# Patient Record
Sex: MALE
[De-identification: names, ages, dates, MRNs, and addresses within clinical notes are randomized per-mention and may not be internally consistent; named-entity substitution may affect disease eponyms.]

---

## 2017-01-05 PROCEDURE — 88300 SURGICAL PATH GROSS: CPT | Performed by: OTOLARYNGOLOGY

## 2017-04-25 PROBLEM — S52.125A CLOSED NONDISPLACED FRACTURE OF HEAD OF LEFT RADIUS, INITIAL ENCOUNTER: Status: ACTIVE | Noted: 2017-04-25

## 2018-09-10 ENCOUNTER — CHARTING TRANS (OUTPATIENT)
Dept: OTHER | Age: 28
End: 2018-09-10

## 2018-11-01 PROBLEM — S52.125A CLOSED NONDISPLACED FRACTURE OF HEAD OF LEFT RADIUS, INITIAL ENCOUNTER: Status: RESOLVED | Noted: 2017-04-25 | Resolved: 2018-11-01

## 2018-12-08 VITALS
WEIGHT: 206.68 LBS | OXYGEN SATURATION: 98 % | HEART RATE: 95 BPM | SYSTOLIC BLOOD PRESSURE: 112 MMHG | HEIGHT: 72 IN | RESPIRATION RATE: 16 BRPM | BODY MASS INDEX: 27.99 KG/M2 | TEMPERATURE: 98.4 F | DIASTOLIC BLOOD PRESSURE: 74 MMHG

## 2019-07-20 ENCOUNTER — APPOINTMENT (OUTPATIENT)
Dept: GENERAL RADIOLOGY | Facility: HOSPITAL | Age: 29
End: 2019-07-20
Attending: EMERGENCY MEDICINE
Payer: COMMERCIAL

## 2019-07-20 ENCOUNTER — HOSPITAL ENCOUNTER (EMERGENCY)
Facility: HOSPITAL | Age: 29
Discharge: HOME | End: 2019-07-20
Attending: EMERGENCY MEDICINE
Payer: COMMERCIAL

## 2019-07-20 VITALS
DIASTOLIC BLOOD PRESSURE: 81 MMHG | OXYGEN SATURATION: 97 % | TEMPERATURE: 98 F | BODY MASS INDEX: 27.09 KG/M2 | SYSTOLIC BLOOD PRESSURE: 127 MMHG | HEART RATE: 71 BPM | RESPIRATION RATE: 14 BRPM | WEIGHT: 200 LBS | HEIGHT: 72 IN
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
## 2019-07-20 DIAGNOSIS — S70.01XA CONTUSION OF RIGHT HIP, INITIAL ENCOUNTER: ICD-10-CM

## 2019-07-20 DIAGNOSIS — T07.XXXA ABRASIONS OF MULTIPLE SITES: ICD-10-CM

## 2019-07-20 DIAGNOSIS — S40.012A CONTUSION OF LEFT SHOULDER, INITIAL ENCOUNTER: ICD-10-CM

## 2019-07-20 PROCEDURE — 72040 X-RAY EXAM NECK SPINE 2-3 VW: CPT | Performed by: EMERGENCY MEDICINE

## 2019-07-20 PROCEDURE — 99284 EMERGENCY DEPT VISIT MOD MDM: CPT

## 2019-07-20 PROCEDURE — 73502 X-RAY EXAM HIP UNI 2-3 VIEWS: CPT | Performed by: EMERGENCY MEDICINE

## 2019-07-20 PROCEDURE — 73030 X-RAY EXAM OF SHOULDER: CPT | Performed by: EMERGENCY MEDICINE

## 2019-07-20 RX ORDER — IBUPROFEN 600 MG/1
600 TABLET ORAL ONCE
Status: COMPLETED | OUTPATIENT
Start: 2019-07-20 | End: 2019-07-20

## 2019-07-20 RX ORDER — CYCLOBENZAPRINE HCL 10 MG
10 TABLET ORAL 3 TIMES DAILY PRN
Qty: 12 TABLET | Refills: 0 | Status: SHIPPED | OUTPATIENT
Start: 2019-07-20 | End: 2019-07-27

## 2019-07-20 RX ORDER — CYCLOBENZAPRINE HCL 10 MG
10 TABLET ORAL ONCE
Status: COMPLETED | OUTPATIENT
Start: 2019-07-20 | End: 2019-07-20

## 2019-07-20 NOTE — ED NOTES
Assumed care of patient. Patient awake and alert x 4. C/O pain 5/10. Medicated- see MAR. Awaiting imaging results at this time. Vitals stable. Will continue to monitor.
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
## 2019-10-03 ENCOUNTER — OFFICE VISIT (OUTPATIENT)
Dept: FAMILY MEDICINE CLINIC | Facility: CLINIC | Age: 29
End: 2019-10-03
Payer: COMMERCIAL

## 2019-10-03 VITALS
RESPIRATION RATE: 16 BRPM | HEIGHT: 70.25 IN | HEART RATE: 76 BPM | DIASTOLIC BLOOD PRESSURE: 70 MMHG | SYSTOLIC BLOOD PRESSURE: 128 MMHG | BODY MASS INDEX: 26.03 KG/M2 | WEIGHT: 181.81 LBS | TEMPERATURE: 98 F

## 2019-10-03 DIAGNOSIS — G47.33 OSA ON CPAP: ICD-10-CM

## 2019-10-03 DIAGNOSIS — R14.0 BLOATING: ICD-10-CM

## 2019-10-03 DIAGNOSIS — R19.7 DIARRHEA, UNSPECIFIED TYPE: ICD-10-CM

## 2019-10-03 DIAGNOSIS — Z99.89 OSA ON CPAP: ICD-10-CM

## 2019-10-03 DIAGNOSIS — Z13.220 LIPID SCREENING: ICD-10-CM

## 2019-10-03 DIAGNOSIS — Z00.00 ANNUAL PHYSICAL EXAM: Primary | ICD-10-CM

## 2019-10-03 DIAGNOSIS — R63.4 WEIGHT LOSS: ICD-10-CM

## 2019-10-03 DIAGNOSIS — Z00.00 LABORATORY EXAM ORDERED AS PART OF ROUTINE GENERAL MEDICAL EXAMINATION: ICD-10-CM

## 2019-10-03 PROCEDURE — 99385 PREV VISIT NEW AGE 18-39: CPT | Performed by: FAMILY MEDICINE

## 2019-10-03 NOTE — PROGRESS NOTES
Patient presents with:  Establish Care: New Pt  Well Adult: Annual physical     HPI:   Kip Pelaez is a 30year old male who presents for a complete physical exam. He is a new patient. Last colonoscopy:  N/a - at 48. Last PSA:  N/a - at 50.    Im Laterality Date   • ENDOSCOPIC SUBMUCOUS RESECTION INFERIOR TURBINATES Bilateral 1/5/2017    Performed by Emy Parada MD at [facility]   • OTHER SURGICAL HISTORY      s/p wisdom tooth extraction   • SEPTOPLASTY NASAL N/A 1/5/2017    Performe reflexes. Normal coordination and gait     ASSESSMENT AND PLAN:     Ranelle Lanes was seen in the office today:  had concerns including Establish Care (New Pt) and Well Adult (Annual physical).     1. Annual physical exam  Overall fair  Need to stop vapi

## 2019-10-10 ENCOUNTER — LAB ENCOUNTER (OUTPATIENT)
Dept: LAB | Age: 29
End: 2019-10-10
Attending: FAMILY MEDICINE
Payer: COMMERCIAL

## 2019-10-10 DIAGNOSIS — Z13.220 LIPID SCREENING: ICD-10-CM

## 2019-10-10 DIAGNOSIS — Z00.00 LABORATORY EXAM ORDERED AS PART OF ROUTINE GENERAL MEDICAL EXAMINATION: ICD-10-CM

## 2019-10-10 PROCEDURE — 80050 GENERAL HEALTH PANEL: CPT | Performed by: FAMILY MEDICINE

## 2019-10-10 PROCEDURE — 36415 COLL VENOUS BLD VENIPUNCTURE: CPT | Performed by: FAMILY MEDICINE

## 2019-10-10 PROCEDURE — 80061 LIPID PANEL: CPT | Performed by: FAMILY MEDICINE

## 2019-10-17 ENCOUNTER — TELEPHONE (OUTPATIENT)
Dept: FAMILY MEDICINE CLINIC | Facility: CLINIC | Age: 29
End: 2019-10-17

## 2020-10-24 ENCOUNTER — HOSPITAL ENCOUNTER (EMERGENCY)
Facility: HOSPITAL | Age: 30
Discharge: HOME | End: 2020-10-24
Attending: PEDIATRICS
Payer: COMMERCIAL

## 2020-10-24 VITALS
SYSTOLIC BLOOD PRESSURE: 155 MMHG | HEART RATE: 94 BPM | BODY MASS INDEX: 26 KG/M2 | TEMPERATURE: 100 F | WEIGHT: 180 LBS | DIASTOLIC BLOOD PRESSURE: 93 MMHG | OXYGEN SATURATION: 98 % | RESPIRATION RATE: 20 BRPM

## 2020-10-24 DIAGNOSIS — Z20.822 CLOSE EXPOSURE TO COVID-19 VIRUS: Primary | ICD-10-CM

## 2020-10-24 PROCEDURE — 99283 EMERGENCY DEPT VISIT LOW MDM: CPT

## 2020-10-24 NOTE — ED PROVIDER NOTES
Patient Seen in: [facility] Emergency Department      History   Patient presents with:  Testing    Stated Complaint: Request for COVID test. Mother in law tested positive. Asymptomatic     HPI    27-year-old male to ER for COVID-19 testing.   He stat Awake, alert, NAD  HEENT: PERRLA; TMS clear; OP clear  COR:  RRR  Chest: clear  Abdomen: soft, NT, no HSM  : normal  Neuro:  CN 2-12 grossly intact, gait normal; strength 5/5 UEs and LEs  Extremities:  CR < 2 sec               ED Course     Labs Reviewed

## 2020-12-03 ENCOUNTER — ANESTHESIA (OUTPATIENT)
Dept: SURGERY | Facility: HOSPITAL | Age: 30
DRG: 330 | End: 2020-12-03
Payer: COMMERCIAL

## 2020-12-03 ENCOUNTER — APPOINTMENT (OUTPATIENT)
Dept: CT IMAGING | Facility: HOSPITAL | Age: 30
DRG: 330 | End: 2020-12-03
Attending: EMERGENCY MEDICINE
Payer: COMMERCIAL

## 2020-12-03 ENCOUNTER — HOSPITAL ENCOUNTER (INPATIENT)
Facility: HOSPITAL | Age: 30
LOS: 6 days | Discharge: HOME | DRG: 330 | End: 2020-12-09
Attending: EMERGENCY MEDICINE | Admitting: INTERNAL MEDICINE
Payer: COMMERCIAL

## 2020-12-03 ENCOUNTER — APPOINTMENT (OUTPATIENT)
Dept: GENERAL RADIOLOGY | Facility: HOSPITAL | Age: 30
DRG: 330 | End: 2020-12-03
Attending: EMERGENCY MEDICINE
Payer: COMMERCIAL

## 2020-12-03 ENCOUNTER — ANESTHESIA EVENT (OUTPATIENT)
Dept: SURGERY | Facility: HOSPITAL | Age: 30
DRG: 330 | End: 2020-12-03
Payer: COMMERCIAL

## 2020-12-03 DIAGNOSIS — K56.609 SMALL BOWEL OBSTRUCTION (HCC): Primary | ICD-10-CM

## 2020-12-03 PROBLEM — R79.89 AZOTEMIA: Status: ACTIVE | Noted: 2020-12-03

## 2020-12-03 PROCEDURE — 0DBB0ZZ EXCISION OF ILEUM, OPEN APPROACH: ICD-10-PCS | Performed by: SURGERY

## 2020-12-03 PROCEDURE — 3074F SYST BP LT 130 MM HG: CPT | Performed by: STUDENT IN AN ORGANIZED HEALTH CARE EDUCATION/TRAINING PROGRAM

## 2020-12-03 PROCEDURE — 3E0T3BZ INTRODUCTION OF ANESTHETIC AGENT INTO PERIPHERAL NERVES AND PLEXI, PERCUTANEOUS APPROACH: ICD-10-PCS | Performed by: ANESTHESIOLOGY

## 2020-12-03 PROCEDURE — 71045 X-RAY EXAM CHEST 1 VIEW: CPT | Performed by: EMERGENCY MEDICINE

## 2020-12-03 PROCEDURE — 3078F DIAST BP <80 MM HG: CPT | Performed by: STUDENT IN AN ORGANIZED HEALTH CARE EDUCATION/TRAINING PROGRAM

## 2020-12-03 PROCEDURE — 76942 ECHO GUIDE FOR BIOPSY: CPT | Performed by: ANESTHESIOLOGY

## 2020-12-03 PROCEDURE — 99254 IP/OBS CNSLTJ NEW/EST MOD 60: CPT | Performed by: SURGERY

## 2020-12-03 PROCEDURE — 3008F BODY MASS INDEX DOCD: CPT | Performed by: STUDENT IN AN ORGANIZED HEALTH CARE EDUCATION/TRAINING PROGRAM

## 2020-12-03 PROCEDURE — 0DN80ZZ RELEASE SMALL INTESTINE, OPEN APPROACH: ICD-10-PCS | Performed by: SURGERY

## 2020-12-03 PROCEDURE — 74177 CT ABD & PELVIS W/CONTRAST: CPT | Performed by: EMERGENCY MEDICINE

## 2020-12-03 PROCEDURE — 99222 1ST HOSP IP/OBS MODERATE 55: CPT | Performed by: STUDENT IN AN ORGANIZED HEALTH CARE EDUCATION/TRAINING PROGRAM

## 2020-12-03 RX ORDER — ONDANSETRON 2 MG/ML
4 INJECTION INTRAMUSCULAR; INTRAVENOUS ONCE
Status: COMPLETED | OUTPATIENT
Start: 2020-12-03 | End: 2020-12-03

## 2020-12-03 RX ORDER — ACETAMINOPHEN 325 MG/1
650 TABLET ORAL EVERY 4 HOURS PRN
Status: DISCONTINUED | OUTPATIENT
Start: 2020-12-03 | End: 2020-12-09

## 2020-12-03 RX ORDER — ENOXAPARIN SODIUM 100 MG/ML
40 INJECTION SUBCUTANEOUS DAILY
Status: DISCONTINUED | OUTPATIENT
Start: 2020-12-04 | End: 2020-12-03 | Stop reason: HOSPADM

## 2020-12-03 RX ORDER — HYDROCODONE BITARTRATE AND ACETAMINOPHEN 5; 325 MG/1; MG/1
1 TABLET ORAL EVERY 4 HOURS PRN
Status: DISCONTINUED | OUTPATIENT
Start: 2020-12-03 | End: 2020-12-09

## 2020-12-03 RX ORDER — HYDROCODONE BITARTRATE AND ACETAMINOPHEN 5; 325 MG/1; MG/1
2 TABLET ORAL EVERY 4 HOURS PRN
Status: DISCONTINUED | OUTPATIENT
Start: 2020-12-03 | End: 2020-12-09

## 2020-12-03 RX ORDER — CEFAZOLIN SODIUM/WATER 2 G/20 ML
SYRINGE (ML) INTRAVENOUS AS NEEDED
Status: DISCONTINUED | OUTPATIENT
Start: 2020-12-03 | End: 2020-12-03 | Stop reason: SURG

## 2020-12-03 RX ORDER — HYDROMORPHONE HYDROCHLORIDE 1 MG/ML
0.2 INJECTION, SOLUTION INTRAMUSCULAR; INTRAVENOUS; SUBCUTANEOUS EVERY 2 HOUR PRN
Status: DISCONTINUED | OUTPATIENT
Start: 2020-12-03 | End: 2020-12-09

## 2020-12-03 RX ORDER — FAMOTIDINE 10 MG/ML
20 INJECTION, SOLUTION INTRAVENOUS 2 TIMES DAILY
Status: DISCONTINUED | OUTPATIENT
Start: 2020-12-03 | End: 2020-12-06

## 2020-12-03 RX ORDER — MORPHINE SULFATE 4 MG/ML
4 INJECTION, SOLUTION INTRAMUSCULAR; INTRAVENOUS ONCE
Status: COMPLETED | OUTPATIENT
Start: 2020-12-03 | End: 2020-12-03

## 2020-12-03 RX ORDER — SODIUM PHOSPHATE, DIBASIC AND SODIUM PHOSPHATE, MONOBASIC 7; 19 G/133ML; G/133ML
1 ENEMA RECTAL ONCE AS NEEDED
Status: DISCONTINUED | OUTPATIENT
Start: 2020-12-03 | End: 2020-12-09

## 2020-12-03 RX ORDER — ONDANSETRON 2 MG/ML
4 INJECTION INTRAMUSCULAR; INTRAVENOUS EVERY 6 HOURS PRN
Status: DISCONTINUED | OUTPATIENT
Start: 2020-12-03 | End: 2020-12-09

## 2020-12-03 RX ORDER — MORPHINE SULFATE 4 MG/ML
2 INJECTION, SOLUTION INTRAMUSCULAR; INTRAVENOUS EVERY 2 HOUR PRN
Status: DISCONTINUED | OUTPATIENT
Start: 2020-12-03 | End: 2020-12-03 | Stop reason: HOSPADM

## 2020-12-03 RX ORDER — HYDROMORPHONE HYDROCHLORIDE 1 MG/ML
0.5 INJECTION, SOLUTION INTRAMUSCULAR; INTRAVENOUS; SUBCUTANEOUS ONCE
Status: COMPLETED | OUTPATIENT
Start: 2020-12-03 | End: 2020-12-03

## 2020-12-03 RX ORDER — HEPARIN SODIUM 5000 [USP'U]/ML
5000 INJECTION, SOLUTION INTRAVENOUS; SUBCUTANEOUS ONCE
Status: COMPLETED | OUTPATIENT
Start: 2020-12-03 | End: 2020-12-03

## 2020-12-03 RX ORDER — MAGNESIUM HYDROXIDE/ALUMINUM HYDROXICE/SIMETHICONE 120; 1200; 1200 MG/30ML; MG/30ML; MG/30ML
30 SUSPENSION ORAL ONCE
Status: COMPLETED | OUTPATIENT
Start: 2020-12-03 | End: 2020-12-03

## 2020-12-03 RX ORDER — SODIUM PHOSPHATE, DIBASIC AND SODIUM PHOSPHATE, MONOBASIC 7; 19 G/133ML; G/133ML
1 ENEMA RECTAL ONCE AS NEEDED
Status: DISCONTINUED | OUTPATIENT
Start: 2020-12-03 | End: 2020-12-03 | Stop reason: HOSPADM

## 2020-12-03 RX ORDER — SODIUM CHLORIDE 9 MG/ML
INJECTION, SOLUTION INTRAVENOUS CONTINUOUS
Status: DISCONTINUED | OUTPATIENT
Start: 2020-12-03 | End: 2020-12-03 | Stop reason: HOSPADM

## 2020-12-03 RX ORDER — SODIUM CHLORIDE, SODIUM LACTATE, POTASSIUM CHLORIDE, CALCIUM CHLORIDE 600; 310; 30; 20 MG/100ML; MG/100ML; MG/100ML; MG/100ML
INJECTION, SOLUTION INTRAVENOUS CONTINUOUS PRN
Status: DISCONTINUED | OUTPATIENT
Start: 2020-12-03 | End: 2020-12-03 | Stop reason: SURG

## 2020-12-03 RX ORDER — HYDROMORPHONE HYDROCHLORIDE 1 MG/ML
INJECTION, SOLUTION INTRAMUSCULAR; INTRAVENOUS; SUBCUTANEOUS
Status: COMPLETED
Start: 2020-12-03 | End: 2020-12-03

## 2020-12-03 RX ORDER — PROCHLORPERAZINE EDISYLATE 5 MG/ML
5 INJECTION INTRAMUSCULAR; INTRAVENOUS EVERY 8 HOURS PRN
Status: DISCONTINUED | OUTPATIENT
Start: 2020-12-03 | End: 2020-12-03 | Stop reason: HOSPADM

## 2020-12-03 RX ORDER — MIDAZOLAM HYDROCHLORIDE 1 MG/ML
INJECTION INTRAMUSCULAR; INTRAVENOUS AS NEEDED
Status: DISCONTINUED | OUTPATIENT
Start: 2020-12-03 | End: 2020-12-03 | Stop reason: SURG

## 2020-12-03 RX ORDER — HEPARIN SODIUM 5000 [USP'U]/ML
5000 INJECTION, SOLUTION INTRAVENOUS; SUBCUTANEOUS EVERY 8 HOURS SCHEDULED
Status: DISCONTINUED | OUTPATIENT
Start: 2020-12-03 | End: 2020-12-09

## 2020-12-03 RX ORDER — ONDANSETRON 2 MG/ML
4 INJECTION INTRAMUSCULAR; INTRAVENOUS EVERY 6 HOURS PRN
Status: DISCONTINUED | OUTPATIENT
Start: 2020-12-03 | End: 2020-12-03 | Stop reason: HOSPADM

## 2020-12-03 RX ORDER — ACETAMINOPHEN 10 MG/ML
INJECTION, SOLUTION INTRAVENOUS AS NEEDED
Status: DISCONTINUED | OUTPATIENT
Start: 2020-12-03 | End: 2020-12-03 | Stop reason: SURG

## 2020-12-03 RX ORDER — ONDANSETRON 2 MG/ML
4 INJECTION INTRAMUSCULAR; INTRAVENOUS AS NEEDED
Status: DISCONTINUED | OUTPATIENT
Start: 2020-12-03 | End: 2020-12-03 | Stop reason: HOSPADM

## 2020-12-03 RX ORDER — HYDROMORPHONE HYDROCHLORIDE 1 MG/ML
0.5 INJECTION, SOLUTION INTRAMUSCULAR; INTRAVENOUS; SUBCUTANEOUS EVERY 5 MIN PRN
Status: DISCONTINUED | OUTPATIENT
Start: 2020-12-03 | End: 2020-12-03 | Stop reason: HOSPADM

## 2020-12-03 RX ORDER — SODIUM CHLORIDE, SODIUM LACTATE, POTASSIUM CHLORIDE, CALCIUM CHLORIDE 600; 310; 30; 20 MG/100ML; MG/100ML; MG/100ML; MG/100ML
INJECTION, SOLUTION INTRAVENOUS CONTINUOUS
Status: DISCONTINUED | OUTPATIENT
Start: 2020-12-03 | End: 2020-12-03 | Stop reason: HOSPADM

## 2020-12-03 RX ORDER — SODIUM CHLORIDE 9 MG/ML
INJECTION, SOLUTION INTRAVENOUS CONTINUOUS
Status: DISCONTINUED | OUTPATIENT
Start: 2020-12-03 | End: 2020-12-06

## 2020-12-03 RX ORDER — DOCUSATE SODIUM 100 MG/1
100 CAPSULE, LIQUID FILLED ORAL 2 TIMES DAILY
Status: DISCONTINUED | OUTPATIENT
Start: 2020-12-03 | End: 2020-12-09

## 2020-12-03 RX ORDER — HYDROMORPHONE HYDROCHLORIDE 1 MG/ML
0.4 INJECTION, SOLUTION INTRAMUSCULAR; INTRAVENOUS; SUBCUTANEOUS EVERY 2 HOUR PRN
Status: DISCONTINUED | OUTPATIENT
Start: 2020-12-03 | End: 2020-12-09

## 2020-12-03 RX ORDER — LIDOCAINE HYDROCHLORIDE 10 MG/ML
INJECTION, SOLUTION EPIDURAL; INFILTRATION; INTRACAUDAL; PERINEURAL AS NEEDED
Status: DISCONTINUED | OUTPATIENT
Start: 2020-12-03 | End: 2020-12-03 | Stop reason: SURG

## 2020-12-03 RX ORDER — POLYETHYLENE GLYCOL 3350 17 G/17G
17 POWDER, FOR SOLUTION ORAL DAILY PRN
Status: DISCONTINUED | OUTPATIENT
Start: 2020-12-03 | End: 2020-12-03 | Stop reason: HOSPADM

## 2020-12-03 RX ORDER — ACETAMINOPHEN 10 MG/ML
1000 INJECTION, SOLUTION INTRAVENOUS EVERY 6 HOURS PRN
Status: DISCONTINUED | OUTPATIENT
Start: 2020-12-03 | End: 2020-12-03 | Stop reason: HOSPADM

## 2020-12-03 RX ORDER — BISACODYL 10 MG
10 SUPPOSITORY, RECTAL RECTAL
Status: DISCONTINUED | OUTPATIENT
Start: 2020-12-03 | End: 2020-12-03 | Stop reason: HOSPADM

## 2020-12-03 RX ORDER — LIDOCAINE HYDROCHLORIDE 20 MG/ML
10 SOLUTION OROPHARYNGEAL ONCE
Status: COMPLETED | OUTPATIENT
Start: 2020-12-03 | End: 2020-12-03

## 2020-12-03 RX ORDER — HYDROMORPHONE HYDROCHLORIDE 1 MG/ML
0.8 INJECTION, SOLUTION INTRAMUSCULAR; INTRAVENOUS; SUBCUTANEOUS EVERY 2 HOUR PRN
Status: DISCONTINUED | OUTPATIENT
Start: 2020-12-03 | End: 2020-12-09

## 2020-12-03 RX ORDER — MORPHINE SULFATE 4 MG/ML
1 INJECTION, SOLUTION INTRAMUSCULAR; INTRAVENOUS EVERY 2 HOUR PRN
Status: DISCONTINUED | OUTPATIENT
Start: 2020-12-03 | End: 2020-12-03 | Stop reason: HOSPADM

## 2020-12-03 RX ORDER — FAMOTIDINE 20 MG/1
20 TABLET ORAL 2 TIMES DAILY
Status: DISCONTINUED | OUTPATIENT
Start: 2020-12-03 | End: 2020-12-09

## 2020-12-03 RX ORDER — MORPHINE SULFATE 4 MG/ML
4 INJECTION, SOLUTION INTRAMUSCULAR; INTRAVENOUS EVERY 2 HOUR PRN
Status: DISCONTINUED | OUTPATIENT
Start: 2020-12-03 | End: 2020-12-03 | Stop reason: HOSPADM

## 2020-12-03 RX ORDER — BISACODYL 10 MG
10 SUPPOSITORY, RECTAL RECTAL
Status: DISCONTINUED | OUTPATIENT
Start: 2020-12-03 | End: 2020-12-09

## 2020-12-03 RX ORDER — NALOXONE HYDROCHLORIDE 0.4 MG/ML
80 INJECTION, SOLUTION INTRAMUSCULAR; INTRAVENOUS; SUBCUTANEOUS AS NEEDED
Status: DISCONTINUED | OUTPATIENT
Start: 2020-12-03 | End: 2020-12-03 | Stop reason: HOSPADM

## 2020-12-03 RX ORDER — POLYETHYLENE GLYCOL 3350 17 G/17G
17 POWDER, FOR SOLUTION ORAL DAILY PRN
Status: DISCONTINUED | OUTPATIENT
Start: 2020-12-03 | End: 2020-12-09

## 2020-12-03 RX ORDER — DEXAMETHASONE SODIUM PHOSPHATE 4 MG/ML
VIAL (ML) INJECTION AS NEEDED
Status: DISCONTINUED | OUTPATIENT
Start: 2020-12-03 | End: 2020-12-03 | Stop reason: SURG

## 2020-12-03 RX ADMIN — DEXAMETHASONE SODIUM PHOSPHATE 4 MG: 4 MG/ML VIAL (ML) INJECTION at 12:57:00

## 2020-12-03 RX ADMIN — SODIUM CHLORIDE, SODIUM LACTATE, POTASSIUM CHLORIDE, CALCIUM CHLORIDE: 600; 310; 30; 20 INJECTION, SOLUTION INTRAVENOUS at 12:21:00

## 2020-12-03 RX ADMIN — CEFAZOLIN SODIUM/WATER 2 G: 2 G/20 ML SYRINGE (ML) INTRAVENOUS at 12:40:00

## 2020-12-03 RX ADMIN — MIDAZOLAM HYDROCHLORIDE 2 MG: 1 INJECTION INTRAMUSCULAR; INTRAVENOUS at 12:23:00

## 2020-12-03 RX ADMIN — SODIUM CHLORIDE, SODIUM LACTATE, POTASSIUM CHLORIDE, CALCIUM CHLORIDE: 600; 310; 30; 20 INJECTION, SOLUTION INTRAVENOUS at 14:13:00

## 2020-12-03 RX ADMIN — ACETAMINOPHEN 1000 MG: 10 INJECTION, SOLUTION INTRAVENOUS at 13:00:00

## 2020-12-03 RX ADMIN — LIDOCAINE HYDROCHLORIDE 50 MG: 10 INJECTION, SOLUTION EPIDURAL; INFILTRATION; INTRACAUDAL; PERINEURAL at 12:25:00

## 2020-12-03 RX ADMIN — ONDANSETRON 4 MG: 2 INJECTION INTRAMUSCULAR; INTRAVENOUS at 12:57:00

## 2020-12-03 RX ADMIN — SODIUM CHLORIDE: 9 INJECTION, SOLUTION INTRAVENOUS at 12:33:00

## 2020-12-03 NOTE — PLAN OF CARE
NURSING ADMISSION NOTE      Patient admitted via [facility] to [facility]. Safety precautions initiated. Bed in low position. Call light in reach. 7:58 PT TRANSFER VIA STRETCHER IN STABLE CONDITION.    Problem: Patient/Family Goals  Goal: Devorah appropriate  - Assist with meals as needed  - Monitor I&O, WT and lab values  - Obtain nutritional consult as needed  - Optimize oral hygiene and moisture  - Encourage food from home; allow for food preferences  - Enhance eating environment  Outcome: Progr

## 2020-12-03 NOTE — ANESTHESIA PROCEDURE NOTES
Airway  Urgency: elective    Airway not difficult    General Information and Staff    Patient location during procedure: OR  Anesthesiologist: Josefina Goldberg MD  Performed: anesthesiologist     Indications and Patient Condition  Indications for airway m

## 2020-12-03 NOTE — ED INITIAL ASSESSMENT (HPI)
Pt here for upper mid abdominal pain non radiating began 10:30pm, pt states felt like gas but had bm and not better. Pt denies n/v/d or urinary problems.

## 2020-12-03 NOTE — H&P
OPAL HOSPITALIST  History and Physical     Rupert John Patient Status:  Emergency    1990 MRN [id number]   Location [place] [address] Attending Abhijit Lewis MD   Hosp Day # 0 PCP Trey Guerra MD     Chief Complaint: 25.10 kg/m²   General: No acute distress. Alert and oriented x 3. HEENT: Normocephalic atraumatic. Moist mucous membranes. EOM-I. PERRLA. Anicteric. Neck: No lymphadenopathy. No JVD. No carotid bruits. Respiratory: Clear to auscultation bilaterally.  No

## 2020-12-03 NOTE — ED PROVIDER NOTES
Patient Seen in: [facility] Emergency Department      History   Patient presents with:  Abdomen/Flank Pain    Stated Complaint: ABD PAIN    HPI  52-year-old man denies any medical history, here with complaint of epigastric discomfort, started approx Exam        Physical Exam  Vitals signs and nursing note reviewed. General: Well-appearing young man sitting in bed no acute distress  Head: Normocephalic and atraumatic.    HEENT:  Mucous membranes are moist.   Cardiovascular:  Normal rate and regular rh be admitted for further evaluation.     Admission disposition: 12/3/2020  6:06 AM                             Disposition and Plan     Clinical Impression:  Small bowel obstruction ([facility] [address]  (primary encounter diagnosis)    Disposition:  Admit  12/3/2020  6:06

## 2020-12-03 NOTE — CONSULTS
[facility]  Report of  Surgical Consultation with History and Physical Exam    Matt Tariq Patient Status:  Inpatient    1990 MRN [id number]   [facility] 0SW-A Attending Stefan Torre, DO   Hosp Day # 0 PCP Paresh Howell extraction   • REPAIR OF NASAL SEPTUM     • SEPTOPLASTY NASAL N/A 1/5/2017    Performed by Edgardo Christianson MD at [facility]     Family History   Problem Relation Age of Onset   • Obesity Father    • Diabetes Father    • Lipids Mother    • Christine Homes drainage, hearing loss and nasal drainage  Eyes:  Negative for eye discharge and vision loss  Gastrointestinal: Positive for abdominal pain and decreased appetite.   Negative for  constipation, diarrhea and vomiting  Genitourinary:  Negative for dysuria and ALT 28   BILT 0.5   TP 7.4         No results for input(s): PTP, INR, PTT in the last 168 hours.       Impression:  Patient Active Problem List:     FREDERICK on CPAP     Azotemia     Small bowel obstruction [facility])      32-year-old male with 1 day history of abd

## 2020-12-03 NOTE — ANESTHESIA PREPROCEDURE EVALUATION
PRE-OP EVALUATION    Patient Name: Rupert Lopez    Pre-op Diagnosis: Small bowel obstruction ([facility] [address] [M59.173]    Procedure(s):  EXPLORATORY LAPAROTOMY, SMALL BOWEL RESECTION    Surgeon(s) and Role:     * Hollis Henriquez, DO - Primary    Pre-op vitals rev Intravenous, Q2H PRN    Or    •  morphINE sulfate (PF) 4 MG/ML injection 4 mg, 4 mg, Intravenous, Q2H PRN        Outpatient Medications:   No medications prior to admission. Allergies: Patient has no known allergies.       Anesthesia Evaluation    Kat Cardiovascular      Rhythm: regular  Rate: normal     Dental    No notable dental history. Pulmonary    Pulmonary exam normal.                 Other findings            ASA: 3   Plan: general  NPO status verified and patient meets guidelines.   Kat

## 2020-12-03 NOTE — PROGRESS NOTES
OPAL HOSPITALIST  Progress Note     Louis Merida Patient Status:  Inpatient    1990 MRN [id number]   [facility] 0SW-A Attending Jerry Collins, [address] [facility] Day # 0 PCP Armando Garcia MD     Chief Complaint: Abdominal pain    S: Pa Oral BID   • famoTIDine  20 mg Oral BID    Or   • famoTIDine  20 mg Intravenous BID   • cefOXitin  2 g Intravenous Q8H     ASSESSMENT / PLAN:     1. High grade SBO s/p ex lap with bowel resection on 12/3/2020  1. General surgery on consult  2.  NPO currentl

## 2020-12-03 NOTE — ANESTHESIA POSTPROCEDURE EVALUATION
[address] [facility] Patient Status:  Inpatient   Age/Gender 35year old male MRN [id number]   [facility] SURGERY Attending Makenna Wood, [address] [facility] Day # 0 PCP Ilia Luna MD       Anesthesia Post-op Note    Procedure(s):

## 2020-12-03 NOTE — ANESTHESIA PROCEDURE NOTES
[facility]  Performed by: Lynette Sierra MD  Authorized by: Lynette Sierra MD       General Information and Staff    Start Time:  12/3/2020 1:52 PM  End Time:  12/3/2020 1:55 AM  Anesthesiologist:  Lynette Sierra MD  Performed by:   Anesthesiol

## 2020-12-04 ENCOUNTER — APPOINTMENT (OUTPATIENT)
Dept: GENERAL RADIOLOGY | Facility: HOSPITAL | Age: 30
DRG: 330 | End: 2020-12-04
Attending: STUDENT IN AN ORGANIZED HEALTH CARE EDUCATION/TRAINING PROGRAM
Payer: COMMERCIAL

## 2020-12-04 PROCEDURE — 71045 X-RAY EXAM CHEST 1 VIEW: CPT | Performed by: STUDENT IN AN ORGANIZED HEALTH CARE EDUCATION/TRAINING PROGRAM

## 2020-12-04 PROCEDURE — 99232 SBSQ HOSP IP/OBS MODERATE 35: CPT | Performed by: INTERNAL MEDICINE

## 2020-12-04 NOTE — PROGRESS NOTES
OPAL HOSPITALIST  Progress Note     Blayne Summers Patient Status:  Inpatient    1990 MRN [id number]   [facility] 0SW-A Attending Terrell Prather, [address] [facility] Day # 1 PCP Reynaldo Mcdaniel MD     Chief Complaint: Abdominal pain    S: Pa Imaging: Imaging data reviewed in Epic.     Medications:   • Heparin Sodium (Porcine)  5,000 Units Subcutaneous [facility]   • docusate sodium  100 mg Oral BID   • famoTIDine  20 mg Oral BID    Or   • famoTIDine  20 mg Intravenous BID     ASSESSMENT / PLAN:

## 2020-12-04 NOTE — PROGRESS NOTES
[facility]  Progress Note    Kip Pelaez Patient Status:  Inpatient    1990 MRN [id number]   [facility] 0SW-A Attending Juventino Puckett, [address] [facility] Day # 1 PCP Beverley Galicia MD     Subjective: The patient is resting in bed. intermittent suction. 2. Patient to remain n.p.o. He may have ice chips, gum, and hard candies. 3. Await return of bowel function. 4. Discussed with the patient and nursing staff the importance of ambulation.   Encourage frequent ambulation and out of b

## 2020-12-04 NOTE — PROGRESS NOTES
12/04/20 1353   Clinical Encounter Type   Visited With Patient and family together   Routine Visit   (Responded to spiritual care request.)   Pentecostal Encounters   Pentecostal Needs Prayer  (Per request, prayed and promoted a sense of peace and inspirati

## 2020-12-04 NOTE — PLAN OF CARE
Problem: Patient/Family Goals  Goal: Patient/Family Long Term Goal  Description: Patient's Long Term Goal: DISCHARGE    Interventions:  - RETURN TO REGULAR ADL'S  -TOLERATE ADVANCED DIET  -COMMUNICATE ANY NEEDS  - See additional Care Plan goals for speci Enhance eating environment  Outcome: Progressing  Goal: Achieves appropriate nutritional intake (bariatric)  Description: INTERVENTIONS:  - Monitor for over-consumption  - Identify factors contributing to increased intake, treat as appropriate  - Monitor I

## 2020-12-04 NOTE — PAYOR COMM NOTE
--------------  ADMISSION REVIEW     Payor: [address] [place] [address]  Subscriber #:  [id number]  Authorization Number: [id number]    Admit date: 12/3/20  Admit time: 5433       Admitting Physician: Maico Ayala DO  Attending Physician:  Charis Panda DO rhonchi or rales.    Abdominal: Soft nontender nondistended, normal bowel sounds, negative Hanson sign, no guarding no rebound tenderness  Skin: Warm and dry  Neurological: Awake alert, speech is normal        ED Course     Labs Reviewed   URINALYSIS WITH C signed by Dennis Monterroso MD at 12/3/2020  6:36 AM     Author: Dennis Monterroso MD Service: Shannan Milligan Author Type: Physician    Filed: 12/3/2020  6:36 AM Date of Service: 12/3/2020  6:34 AM Status: Signed    : Dennis Monterroso MD (Physician)           Chelsey Strickland 12/03/20  0239   GLU 98   BUN 23*   CREATSERUM 1.06   GFRAA 109   GFRNAA 94   CA 9.8   ALB 4.3      K 3.9      CO2 25.0   ALKPHO 49   AST 20   ALT 28   BILT 0.5   TP 7.4       Estimated Creatinine Clearance: 109.5 mL/min (based on SCr of 1.06 m Intravenous Mir Nunez MD    12/3/2020 1247 Given 100 mcg Intravenous Mir Nunez MD    12/3/2020 1232 Given 50 mcg Intravenous Mir Nunez MD    12/3/2020 1223 Given 100 mcg Intravenous Mir Nunez MD      Heparin Sodium (Porcine) 4 mg Intravenous Sonal Brandon MD      phenol (CHLORASEPTIC) 1.4 % oral liquid spray 1 mL     Date Action Dose Route User    12/4/2020 0954 Given 1 mL Mouth/Throat Chrissie Mendiola RN      propofol (DIPRIVAN) injection     Date Action Dose Route User diverticulum with small bowel pinned to the mesentery from Meckel diverticulum adhesive bands.  This clearly was the site of the obstruction as the small bowel distal to the Meckel diverticulum was decompressed leading up to the ileocecal valve.     The sma gloves were changed. Copious irrigation was carried out within the abdominal cavity. All fluid was suctioned clear. The anastomosis was found to be intact. The posterior peritoneal layer was approximated using a single run of #1 Vicryl suture.   The ant

## 2020-12-04 NOTE — OPERATIVE REPORT
[facility]    PATIENT'S NAME: Brenda Guerrier   ATTENDING PHYSICIAN: Chano Valladares DO   OPERATING PHYSICIAN: Babs Urbano D.O.   PATIENT ACCOUNT#:   [de-identified]    LOCATION:  [address] [place] [address]  MEDICAL RECORD #:   [id number]       DATE OF BIRTH: chosen approximately 8 cm proximal to the Meckel diverticulum, opening made in the ileal mesentery, and the ileum was divided using a SKYLA 75 stapling device with a blue staple load.   A point was chosen distal to the Meckel diverticulum where the bowel was

## 2020-12-05 PROCEDURE — 99232 SBSQ HOSP IP/OBS MODERATE 35: CPT | Performed by: INTERNAL MEDICINE

## 2020-12-05 NOTE — PROGRESS NOTES
12/05/20 5924   Clinical Encounter Type   Visited With Health care provider;Patient and family together  (RN (Nurse Leader))   Routine Visit   (Responded to request for consult - pt. request)      offered introduction and support.   Patient said

## 2020-12-05 NOTE — PROGRESS NOTES
1500: Aspirated 20 cc's from NG tube; NG then removed, intact. Pt tolerated well. Will advance patient to clear liquid diet. Will continue to monitor.

## 2020-12-05 NOTE — PROGRESS NOTES
PT IS A&O X 4. HE IS NPO W/ICE CHIPS AND GUM. HE IS UP W/SB ASSIST. HE HAS IV FLUIDS INFUSING. HAS NG TUBE TO RIGHT NARE TO LIS. HE IS FREDERICK W. AND ON TELE. HE MANAGES PAIN W/PRN DILAUDID. HE AMBULATED IN THE FREY.  PT SEEMS PRETTY UNCOMFORTABLE AND IN SHAWNA

## 2020-12-05 NOTE — PLAN OF CARE
Pt alert and oriented x4. IVF infusing. NG clamped this morning at 9AM; NG secured at 64 cm. Pt reports passing gas. Voiding without difficulty. IS up to 1500. Tele, NSR 90's, , RA. Pt denies SOB, chest pain, SOB.  Pt requesting pain medication q 2 hours routine/schedule  - Consider collaborating with pharmacy to review patient's medication profile  Outcome: Progressing  Goal: Maintains adequate nutritional intake (undernourished)  Description: INTERVENTIONS:  - Monitor percentage of each meal consumed  -

## 2020-12-05 NOTE — PROGRESS NOTES
[facility]  Progress Note    Cr Rodrigues Patient Status:  Inpatient    1990 MRN [id number]   [facility] 0SW-A Attending Debi Evangelista, [address] [facility] Day # 2 PCP Perico Saavedra MD     Subjective:  No new complaints.  He reports a Still complaining of incisional pain. No nausea. Tolerating NG tube clamping thus far. Multiple episodes of flatus overnight. Encourage ambulation.   Possible DC NG tube later today if continues to tolerate clamp trial      I agree with the note as scr

## 2020-12-05 NOTE — PROGRESS NOTES
OPAL HOSPITALIST  Progress Note     Lydia Tobar Patient Status:  Inpatient    1990 MRN [id number]   [facility] 0SW-A Attending Chloe Louis, [address] [facility] Day # 2 PCP Vinnie Mendez MD     Chief Complaint: Abdominal pain    S: Pa hours. No results for input(s): TROP, CK in the last 168 hours. Imaging: Imaging data reviewed in Epic.     Medications:   • Heparin Sodium (Porcine)  5,000 Units Subcutaneous [facility]   • docusate sodium  100 mg Oral BID   • famoTIDine  20 mg Oral BI

## 2020-12-06 PROCEDURE — 99232 SBSQ HOSP IP/OBS MODERATE 35: CPT | Performed by: INTERNAL MEDICINE

## 2020-12-06 RX ORDER — HYDROCODONE BITARTRATE AND ACETAMINOPHEN 5; 325 MG/1; MG/1
1 TABLET ORAL EVERY 6 HOURS PRN
Qty: 15 TABLET | Refills: 0 | Status: SHIPPED | OUTPATIENT
Start: 2020-12-06 | End: 2020-12-18

## 2020-12-06 NOTE — PROGRESS NOTES
Pt alert and oriented x4. Tolerating full liquid diet. Pt up ad anne marie after set up. Pt ambulated in hallway 4x today. Pt receiving tylenol for pain, reporting pain as mild. Pt mildly nauseous, received zofran from writing RN. Voiding without difficulty.  IV S

## 2020-12-06 NOTE — PROGRESS NOTES
OPAL HOSPITALIST  Progress Note     Louise Rasmussen Patient Status:  Inpatient    1990 MRN [id number]   [facility] 0SW-A Attending Rehana Rizzo, [address] [facility] Day # 3 PCP Komal Holcomb MD     Chief Complaint: Abdominal pain    S: Pa the last 168 hours. Imaging: Imaging data reviewed in Epic.     Medications:   • Heparin Sodium (Porcine)  5,000 Units Subcutaneous [facility]   • docusate sodium  100 mg Oral BID   • famoTIDine  20 mg Oral BID    Or   • famoTIDine  20 mg Intravenous BID

## 2020-12-06 NOTE — PROGRESS NOTES
[facility]  Progress Note    Pancho Abbott Patient Status:  Inpatient    1990 MRN [id number]   [facility] 0SW-A Attending Moisés Montes, [address] [facility] Day # 3 PCP Solomon Alvarez MD     Subjective:   The patient is sitting up in ch PA  I have made all appropriate changes in documentation as necessary  I attest to the accuracy of this note as detailed above    Rina Meyers MD FACS

## 2020-12-06 NOTE — PROGRESS NOTES
1800: Pt tolerating clear liquids. Pt reports he continues to pass gas. Pt complaining of sore shoulders; heating pack applied. Pt denies SOB/chest pain. Pt reports incisional pain is tolerable. Will continue to monitor.

## 2020-12-06 NOTE — PLAN OF CARE
Pt alert and oriented x4. Pt rating pain as mild, declines need for pain medication. Tele, HR 80's, RA, . IS up to 1750. Tolerating clear liquid diet. Pt reports he is passing gas. Pt denies n/v, SOB, chest pain. IV SL. ML with staples C/D/I and INNA.  Up Encourage mobilization and activity  - Obtain nutritional consult as needed  - Establish a toileting routine/schedule  - Consider collaborating with pharmacy to review patient's medication profile  12/6/2020 0944 by Ozzie Ewing RN  Outcome: Progressin

## 2020-12-07 PROCEDURE — 99232 SBSQ HOSP IP/OBS MODERATE 35: CPT | Performed by: INTERNAL MEDICINE

## 2020-12-07 RX ORDER — VANCOMYCIN HYDROCHLORIDE 125 MG/1
125 CAPSULE ORAL EVERY 6 HOURS
Status: DISCONTINUED | OUTPATIENT
Start: 2020-12-07 | End: 2020-12-09

## 2020-12-07 NOTE — PLAN OF CARE
A&Ox4. RA. VSS. Denies pain and nausea at this time. Lung sounds diminished. Encouraged incentive spirometer use. Abdomen soft, distended, and tender. Bowel sounds hypoactive. Patient reports bowel movement this morning.  Midline incision with staples open consult as needed  - Establish a toileting routine/schedule  - Consider collaborating with pharmacy to review patient's medication profile  Outcome: Progressing  Goal: Maintains adequate nutritional intake (undernourished)  Description: INTERVENTIONS:  - M

## 2020-12-07 NOTE — PROGRESS NOTES
[facility]  Progress Note    Pleas Lot Patient Status:  Inpatient    1990 MRN [id number]   [facility] 0SW-A Attending Zulema Larkin, [address] [facility] Day # 4 PCP Lila Fernández MD     Subjective:   The patient states that he is f options.     Kayla Allison  12/7/2020  10:12 AM

## 2020-12-07 NOTE — PLAN OF CARE
A&Ox4, VSS, lungs clear bilaterally, diminished in bases. Breathing regular/non-labored on RA. Abd is soft, non-distended. Bowel sounds hypoactive. Pt reports gas and diarrhea.  Pt states nausea w/ full liquid diet and had one instance of emesis, but states ADULT  Goal: Achieves appropriate nutritional intake (bariatric)  Description: INTERVENTIONS:  - Monitor for over-consumption  - Identify factors contributing to increased intake, treat as appropriate  - Monitor I&O, WT and lab values  - Obtain nutritional

## 2020-12-07 NOTE — PAYOR COMM NOTE
--------------  CONTINUED STAY REVIEW    Payor: [facility]  Subscriber #:  [id number]  Authorization Number: [id number]    Admit date: 12/3/20  Admit time: 5953    Admitting Physician: Hayden Lujan DO  Attending Physician:  Julian Allen DO TP 7.4  --       Estimated Creatinine Clearance: 119.7 mL/min (based on SCr of 0.97 mg/dL).     No results for input(s): PTP, INR in the last 168 hours.     No results for input(s): TROP, CK in the last 168 hours. Imaging: Imaging data reviewed in Epic. INR     I/O last 3 completed shifts:   In: 4180 [P.O.:180; I.V.:4000]  Out: 2050 [Urine:1250; Emesis/NG output:800]  I/O this shift:  In: 120 [P.O.:120]  Out: 200 [Urine:200]     Assessment  Patient Active Problem List:     FREDERICK on CPAP     Azotemia     Smal tube present  Respiratory: Clear to auscultation bilaterally. No wheezes. No rhonchi. Cardiovascular: S1, S2. Regular rate and rhythm. No murmurs, rubs or gallops. Abdomen: Soft, +postop tenderness, nondistended. Dressing c/d/i.  Hypoactive bowel sounds Mr. Arnold Due is expected to be discharge to: [address] [place],9D of care discussed with patient, RN.     Emerita Bai DO            Electronically signed by Keren Keys DO at 12/5/2020  1:21 PM          MEDICATIONS ADMINISTERED IN LAST 1 DAY:  acetaminophen (T

## 2020-12-07 NOTE — PROGRESS NOTES
OPAL HOSPITALIST  Progress Note     Blayne Summers Patient Status:  Inpatient    1990 MRN [id number]   [facility] 0SW-A Attending Terrell Prather, [address] [facility] Day # 4 PCP Reynaldo Mcdaniel MD     Chief Complaint: Abdominal pain    S: Pa input(s): PTP, INR in the last 168 hours. No results for input(s): TROP, CK in the last 168 hours. Imaging: Imaging data reviewed in Epic.     Medications:   • Heparin Sodium (Porcine)  5,000 Units Subcutaneous Q8H [facility]   • docusate sodium  100 mg Ora

## 2020-12-07 NOTE — CM/SW NOTE
SW completed a chart review to identify needs and provide resources if needed. Pt is from home with his wife, Jose Collier. No SW needs identified at this time. Social work to remain available for support or any discharge planning needs.     Renetta Blunt

## 2020-12-08 ENCOUNTER — TELEPHONE (OUTPATIENT)
Dept: SURGERY | Facility: CLINIC | Age: 30
End: 2020-12-08

## 2020-12-08 PROCEDURE — 99232 SBSQ HOSP IP/OBS MODERATE 35: CPT | Performed by: INTERNAL MEDICINE

## 2020-12-08 RX ORDER — POTASSIUM CHLORIDE 20 MEQ/1
40 TABLET, EXTENDED RELEASE ORAL EVERY 4 HOURS
Status: COMPLETED | OUTPATIENT
Start: 2020-12-08 | End: 2020-12-08

## 2020-12-08 NOTE — TELEPHONE ENCOUNTER
Family questioning how contagious patient is with C. Diff and what precautions should be taken at home. Explained contact limitation, hand washing with soap and water.  Concerned that he will home too soon and infect rest of family, again stated hand washin

## 2020-12-08 NOTE — PLAN OF CARE
Pt vss, afebrile, aox4. Pt denies pain at this time, denies difficulty in breathing, denies shortness of breath, denies lightheadedness. Pt continues to have liquid brown stool. CDIFF isolation precautions is place.  Pt educated on hand washing for infectio mobilization and activity  - Obtain nutritional consult as needed  - Establish a toileting routine/schedule  - Consider collaborating with pharmacy to review patient's medication profile  Outcome: Progressing  Goal: Maintains adequate nutritional intake (u

## 2020-12-08 NOTE — PROGRESS NOTES
OP[facility] HOSPITALIST  Progress Note     Rupert Lopez Patient Status:  Inpatient    1990 MRN [id number]   [facility] 0SW-A Attending Charis Batistar, [address] [facility] Day # 5 PCP Trey Guerra MD     Chief Complaint: Abdominal pain    S: Pa CO2 25.0 22.0 29.0   ALKPHO 49  --   --    AST 20  --   --    ALT 28  --   --    BILT 0.5  --   --    TP 7.4  --   --      Estimated Creatinine Clearance: 148.8 mL/min (based on SCr of 0.78 mg/dL).     No results for input(s): PTP, INR in the last 168 rian

## 2020-12-08 NOTE — PROGRESS NOTES
[facility]  Progress Note    Lydia Tobar Patient Status:  Inpatient    1990 MRN [id number]   [facility] 0SW-A Attending Chloe Louis, [address] [facility] Day # 5 PCP Vinnie Mendez MD     Subjective:   The patient was found to be C. tentatively plan for discharge home tomorrow if the symptoms continue to improve  4. Ambulate and up to chair  5. Follow-up with [facility] general surgery in 1 week. My total face time with this patient was 22 minutes.   Greater than half of our visit was spen

## 2020-12-08 NOTE — PLAN OF CARE
A & O x 4. Room air. Denies pain. Pills whole. Tolerated full liquid diet; will advance as tolerated. Denies any nausea. Loose bowel movements; pt reports improved from night earlier. Currently on iso for Cdiff. Ambulating in hallway.  Staples intact to abd factors contributing to decreased intake, treat as appropriate  - Assist with meals as needed  - Monitor I&O, WT and lab values  - Obtain nutritional consult as needed  - Optimize oral hygiene and moisture  - Encourage food from home; allow for food prefer

## 2020-12-08 NOTE — PAYOR COMM NOTE
--------------  CONTINUED STAY REVIEW    Payor: [address] [facility]  Subscriber #:  [id number]  Authorization Number: [id number]    Admit date: 12/3/20  Admit time: 5477    Admitting Physician: Sarah Alford DO  Attending Physician:  Lizzie Sanchez DO 121 141   GFRNAA 94 [phone]   CA 9.8 8.8 9.5   ALB 4.3  --   --     138 138   K 3.9 4.7 3.5    110 103   CO2 25.0 22.0 29.0   ALKPHO 49  --   --    AST 20  --   --    ALT 28  --   --    BILT 0.5  --   --    TP 7.4  --   --       Estimated Creat Lower Abdomen) Emerald Maya RN    12/7/2020 1451 Given 5,000 Units Subcutaneous (Left Lower Abdomen) Ze Fuller RN      Potassium Chloride ER (K-DUR M20) CR tab 40 mEq     Date Action Dose Route User    12/8/2020 4995 Given 40 mEq Oral Dimitrios Cortés

## 2020-12-09 VITALS
HEIGHT: 71 IN | OXYGEN SATURATION: 100 % | RESPIRATION RATE: 18 BRPM | HEART RATE: 68 BPM | WEIGHT: 180 LBS | DIASTOLIC BLOOD PRESSURE: 74 MMHG | TEMPERATURE: 98 F | SYSTOLIC BLOOD PRESSURE: 129 MMHG | BODY MASS INDEX: 25.2 KG/M2

## 2020-12-09 PROCEDURE — 99238 HOSP IP/OBS DSCHRG MGMT 30/<: CPT | Performed by: HOSPITALIST

## 2020-12-09 RX ORDER — VANCOMYCIN HYDROCHLORIDE 125 MG/1
125 CAPSULE ORAL EVERY 6 HOURS
Qty: 48 CAPSULE | Refills: 0 | Status: SHIPPED | OUTPATIENT
Start: 2020-12-09 | End: 2020-12-21

## 2020-12-09 NOTE — PLAN OF CARE
Pt vss, afebrile, aox4. Pt denies pain at this time, denies difficulty in breathing, denies shortness of breath, denies lightheadedness. Pt continues to have liquid brown stool. CDIFF isolation precautions is place.  Pt on room air, practicing incentive spi of GI medications  - Encourage mobilization and activity  - Obtain nutritional consult as needed  - Establish a toileting routine/schedule  - Consider collaborating with pharmacy to review patient's medication profile  Outcome: Progressing  Goal: Maintains

## 2020-12-09 NOTE — PAYOR COMM NOTE
--------------  CONTINUED STAY REVIEW    Payor: [address] [facility]  Subscriber #:  [id number]  Authorization Number: [id number]    Admit date: 12/3/20  Admit time: 1272    Admitting Physician: Sheela Drew DO  Attending Physician:  Jose Mejia the patient on the above listed diagnoses and treatment options.     Amando Vaughn Pa-C  12/9/2020  10:12 AM  Patient seen and examined his abdomen is soft.   Stools are decreasing patient will be discharged today under care of hospitalist for C. diffici

## 2020-12-09 NOTE — PLAN OF CARE
Problem: GASTROINTESTINAL - ADULT  Goal: Minimal or absence of nausea and vomiting  Description: INTERVENTIONS:  - Maintain adequate hydration with IV or PO as ordered and tolerated  - Nasogastric tube to low intermittent suction as ordered  - Evaluate e

## 2020-12-09 NOTE — DISCHARGE SUMMARY
[facility] [place] HOSPITALIST  DISCHARGE SUMMARY     Gemini Berrios Patient Status:  Inpatient    1990 MRN [id number]   [facility] 0SW-A Attending Mj Miller MD   Psychiatric Day # 6 PCP Abdiel Parra MD     Date of Admission: 12/3/2020  Date 12 days.    Stop taking on: December 21, 2020  Quantity: 48 capsule  Refills: 0           Where to Get Your Medications      These medications were sent to [facility] - [address] [phone], [phone]  [address]

## 2020-12-09 NOTE — PROGRESS NOTES
[facility]  Progress Note    Gemini Berrios Patient Status:  Inpatient    1990 MRN [id number]   [facility] 0SW-A Attending Keren Keys, [address] [facility] Day # 6 PCP Conception MD Aida     Subjective:   The patient  Is tolerating a fu AM  Patient seen and examined his abdomen is soft. Stools are decreasing patient will be discharged today under care of hospitalist for C. difficile infection.   Back to the office in 1 to 2 weeks for staple removal

## 2020-12-10 NOTE — PLAN OF CARE
Written and verbal discharge instructions given to patient and he verbalize understanding. Prescription given. Patient left floor in stable condition via wc accompanied by staff.

## 2020-12-10 NOTE — PAYOR COMM NOTE
--------------  DISCHARGE REVIEW    Payor: [facility]  Subscriber #:  [id number]  Authorization Number: [id number]    Admit date: 12/3/20  Admit time:  0898  Discharge Date: 12/9/2020  6:02 PM     Admitting Physician: DO Jane Trevino for TCM follow-up.     Procedures during hospitalization:   • See above    Incidental or significant findings and recommendations (brief descriptions):  • none    Lab/Test results pending at Discharge:   · none    Consultants:  • General surgery    [address] c/d/i  Neurologic: No focal neurological deficits. Musculoskeletal: Moves all extremities. Extremities: No edema.   -----------------------------------------------------------------------------------------------  PATIENT DISCHARGE INSTRUCTIONS: See elect

## 2020-12-16 ENCOUNTER — OFFICE VISIT (OUTPATIENT)
Dept: FAMILY MEDICINE CLINIC | Facility: CLINIC | Age: 30
End: 2020-12-16
Payer: COMMERCIAL

## 2020-12-16 VITALS
DIASTOLIC BLOOD PRESSURE: 80 MMHG | SYSTOLIC BLOOD PRESSURE: 128 MMHG | WEIGHT: 171.19 LBS | BODY MASS INDEX: 24.51 KG/M2 | HEART RATE: 84 BPM | RESPIRATION RATE: 16 BRPM | HEIGHT: 70 IN | TEMPERATURE: 98 F

## 2020-12-16 DIAGNOSIS — A04.72 CLOSTRIDIUM DIFFICILE DIARRHEA: ICD-10-CM

## 2020-12-16 DIAGNOSIS — K56.609 SBO (SMALL BOWEL OBSTRUCTION) (HCC): ICD-10-CM

## 2020-12-16 DIAGNOSIS — Q43.0 MECKEL DIVERTICULUM: Primary | ICD-10-CM

## 2020-12-16 PROCEDURE — 99214 OFFICE O/P EST MOD 30 MIN: CPT | Performed by: FAMILY MEDICINE

## 2020-12-16 PROCEDURE — 1111F DSCHRG MED/CURRENT MED MERGE: CPT | Performed by: FAMILY MEDICINE

## 2020-12-16 PROCEDURE — 3008F BODY MASS INDEX DOCD: CPT | Performed by: FAMILY MEDICINE

## 2020-12-16 PROCEDURE — 3079F DIAST BP 80-89 MM HG: CPT | Performed by: FAMILY MEDICINE

## 2020-12-16 PROCEDURE — 3074F SYST BP LT 130 MM HG: CPT | Performed by: FAMILY MEDICINE

## 2020-12-16 NOTE — PROGRESS NOTES
Patient presents with: Follow - Up: ER Visit From 12/03-12/09 for Bowel Obstruction  Immunotherapy: Declines Flu Vaccine     HPI:   Bri Macedo is a 35year old male here today for hospital follow up.    He was discharged from Inpatient hospital, Sheryle Gaudy standard drinks      Frequency: 2-4 times a month      Drinks per session: 3 or 4      Binge frequency: Less than monthly      Comment: occasional     REVIEW OF SYSTEMS:     Constitutional: negative  Eyes: negative  Ears, nose, mouth, throat, and face: neg intraperitoneal air at this time.   Differential considerations   would include internal hernia, effusions, or possibly a Meckel's diverticulum    Component      Latest Ref Rng & Units 12/3/2020   WBC      4.0 - 11.0 x10(3) uL 7.9   RBC      4.30 - 5.70 x10 DIFFICILE(TOXIGENIC)PCR  Order: [id number]  Collected:  12/7/2020  4:49 PM Status:  Final result  Specimen Information: Stool        Component   Ref Range & Units    C.  Difficile Toxin B Gene   Negative PositiveAbnormal               ASSESSMENT AND PLAN:

## 2020-12-18 ENCOUNTER — OFFICE VISIT (OUTPATIENT)
Dept: SURGERY | Facility: CLINIC | Age: 30
End: 2020-12-18

## 2020-12-18 VITALS
TEMPERATURE: 98 F | DIASTOLIC BLOOD PRESSURE: 75 MMHG | HEIGHT: 70 IN | BODY MASS INDEX: 24.82 KG/M2 | WEIGHT: 173.38 LBS | SYSTOLIC BLOOD PRESSURE: 135 MMHG | HEART RATE: 80 BPM

## 2020-12-18 DIAGNOSIS — Z90.49 HISTORY OF RESECTION OF SMALL BOWEL: Primary | ICD-10-CM

## 2020-12-18 PROCEDURE — 99024 POSTOP FOLLOW-UP VISIT: CPT | Performed by: PHYSICIAN ASSISTANT

## 2020-12-18 PROCEDURE — 3078F DIAST BP <80 MM HG: CPT | Performed by: PHYSICIAN ASSISTANT

## 2020-12-18 PROCEDURE — 3075F SYST BP GE 130 - 139MM HG: CPT | Performed by: PHYSICIAN ASSISTANT

## 2020-12-18 PROCEDURE — 3008F BODY MASS INDEX DOCD: CPT | Performed by: PHYSICIAN ASSISTANT

## 2020-12-18 NOTE — PROGRESS NOTES
Post Operative Visit Note       Active Problems  1. History of resection of small bowel         Chief Complaint   Patient presents with:  Post-Op: Post op -12/3 Exp Lap Small bowel resection RWM. Patient denies any pain, discharge or bleeding. No NV fever History      Occupation: Student at [employer]    Tobacco Use      Smoking status: Former Smoker      Smokeless tobacco: Never Used      Tobacco comment: vape    Substance and Sexual Activity      Alcohol use:  Yes        Alcohol/week: 0.0 standard dr appears well-developed and well-nourished. HENT:   Head: Normocephalic and atraumatic. Cardiovascular: Normal rate and regular rhythm. Pulmonary/Chest: Effort normal and breath sounds normal.   Abdominal: Soft.  Bowel sounds are normal. He exhibits no

## 2020-12-21 ENCOUNTER — TELEPHONE (OUTPATIENT)
Dept: SURGERY | Facility: CLINIC | Age: 30
End: 2020-12-21

## 2021-01-30 ENCOUNTER — HOSPITAL ENCOUNTER (EMERGENCY)
Facility: HOSPITAL | Age: 31
Discharge: HOME | End: 2021-01-30
Attending: EMERGENCY MEDICINE
Payer: COMMERCIAL

## 2021-01-30 VITALS
RESPIRATION RATE: 18 BRPM | HEART RATE: 84 BPM | WEIGHT: 180 LBS | DIASTOLIC BLOOD PRESSURE: 88 MMHG | TEMPERATURE: 98 F | BODY MASS INDEX: 25.2 KG/M2 | HEIGHT: 71 IN | SYSTOLIC BLOOD PRESSURE: 146 MMHG | OXYGEN SATURATION: 99 %

## 2021-01-30 DIAGNOSIS — T15.02XA FOREIGN BODY OF LEFT CORNEA, INITIAL ENCOUNTER: Primary | ICD-10-CM

## 2021-01-30 PROCEDURE — 99283 EMERGENCY DEPT VISIT LOW MDM: CPT

## 2021-01-30 RX ORDER — TETRACAINE HYDROCHLORIDE 5 MG/ML
1 SOLUTION OPHTHALMIC ONCE
Status: COMPLETED | OUTPATIENT
Start: 2021-01-30 | End: 2021-01-30

## 2021-01-30 RX ORDER — OFLOXACIN 3 MG/ML
2 SOLUTION/ DROPS OPHTHALMIC 4 TIMES DAILY
Qty: 1 BOTTLE | Refills: 0 | Status: SHIPPED | OUTPATIENT
Start: 2021-01-30 | End: 2021-02-06

## 2021-01-30 NOTE — ED INITIAL ASSESSMENT (HPI)
Left eye irritation since yesterday. Denies fever. Denies injury. No recent illness. No visual changes. Describes burning and itching to eye.

## 2021-01-30 NOTE — ED PROVIDER NOTES
Patient Seen in: [facility] Emergency Department      History   Patient presents with:   Eye Visual Problem    Stated Complaint: L eye redness, denies injury     HPI/Subjective:   HPI    Patient is a 32-year-old male who states that yesterday morning 1710]   /90   Pulse 91   Resp 16   Temp 98.3 °F (36.8 °C)   Temp src Temporal   SpO2 100 %   O2 Device None (Room air)       Current:/90   Pulse 91   Temp 98.3 °F (36.8 °C) (Temporal)   Resp 16   Ht 180.3 cm (5' 11\")   Wt 81.6 kg   SpO2 100%

## 2021-03-15 DIAGNOSIS — Z23 NEED FOR VACCINATION: ICD-10-CM

## 2022-01-25 ENCOUNTER — WALK IN (OUTPATIENT)
Dept: URGENT CARE | Age: 32
End: 2022-01-25

## 2022-01-25 VITALS
OXYGEN SATURATION: 99 % | TEMPERATURE: 98.4 F | RESPIRATION RATE: 18 BRPM | SYSTOLIC BLOOD PRESSURE: 155 MMHG | HEART RATE: 96 BPM | DIASTOLIC BLOOD PRESSURE: 78 MMHG

## 2022-01-25 DIAGNOSIS — R09.81 CONGESTION OF NASAL SINUS: ICD-10-CM

## 2022-01-25 DIAGNOSIS — J32.9 SINUSITIS, UNSPECIFIED CHRONICITY, UNSPECIFIED LOCATION: Primary | ICD-10-CM

## 2022-01-25 PROCEDURE — 99202 OFFICE O/P NEW SF 15 MIN: CPT | Performed by: EMERGENCY MEDICINE

## 2022-01-25 PROCEDURE — U0005 INFEC AGEN DETEC AMPLI PROBE: HCPCS | Performed by: PSYCHIATRY & NEUROLOGY

## 2022-01-25 PROCEDURE — U0003 INFECTIOUS AGENT DETECTION BY NUCLEIC ACID (DNA OR RNA); SEVERE ACUTE RESPIRATORY SYNDROME CORONAVIRUS 2 (SARS-COV-2) (CORONAVIRUS DISEASE [COVID-19]), AMPLIFIED PROBE TECHNIQUE, MAKING USE OF HIGH THROUGHPUT TECHNOLOGIES AS DESCRIBED BY CMS-2020-01-R: HCPCS | Performed by: PSYCHIATRY & NEUROLOGY

## 2022-01-25 RX ORDER — AMOXICILLIN AND CLAVULANATE POTASSIUM 875; 125 MG/1; MG/1
1 TABLET, FILM COATED ORAL 2 TIMES DAILY
Qty: 14 TABLET | Refills: 0 | Status: SHIPPED | OUTPATIENT
Start: 2022-01-25 | End: 2022-02-01

## 2022-01-25 ASSESSMENT — PAIN SCALES - GENERAL: PAINLEVEL: 0

## 2022-01-26 LAB
SARS-COV-2 RNA RESP QL NAA+PROBE: NOT DETECTED
SERVICE CMNT-IMP: NORMAL
SERVICE CMNT-IMP: NORMAL

## 2022-03-09 ENCOUNTER — WALK IN (OUTPATIENT)
Dept: URGENT CARE | Age: 32
End: 2022-03-09

## 2022-03-09 VITALS
DIASTOLIC BLOOD PRESSURE: 82 MMHG | TEMPERATURE: 98 F | SYSTOLIC BLOOD PRESSURE: 127 MMHG | HEART RATE: 91 BPM | RESPIRATION RATE: 16 BRPM | OXYGEN SATURATION: 100 %

## 2022-03-09 DIAGNOSIS — B97.89 VIRAL SINUSITIS: Primary | ICD-10-CM

## 2022-03-09 DIAGNOSIS — R09.81 CONGESTION OF NASAL SINUS: ICD-10-CM

## 2022-03-09 DIAGNOSIS — J32.9 VIRAL SINUSITIS: Primary | ICD-10-CM

## 2022-03-09 LAB — SARS-COV+SARS-COV-2 AG RESP QL IA.RAPID: NOT DETECTED

## 2022-03-09 PROCEDURE — 99212 OFFICE O/P EST SF 10 MIN: CPT | Performed by: EMERGENCY MEDICINE

## 2022-03-09 PROCEDURE — 87426 SARSCOV CORONAVIRUS AG IA: CPT | Performed by: EMERGENCY MEDICINE

## 2022-03-09 ASSESSMENT — PAIN SCALES - GENERAL: PAINLEVEL: 3

## 2022-05-30 ENCOUNTER — WALK IN (OUTPATIENT)
Dept: URGENT CARE | Age: 32
End: 2022-05-30

## 2022-05-30 VITALS
RESPIRATION RATE: 18 BRPM | DIASTOLIC BLOOD PRESSURE: 74 MMHG | HEART RATE: 112 BPM | OXYGEN SATURATION: 99 % | TEMPERATURE: 98.8 F | SYSTOLIC BLOOD PRESSURE: 139 MMHG

## 2022-05-30 DIAGNOSIS — R50.9 FEVER, UNSPECIFIED FEVER CAUSE: ICD-10-CM

## 2022-05-30 DIAGNOSIS — Z20.822 SUSPECTED COVID-19 VIRUS INFECTION: ICD-10-CM

## 2022-05-30 DIAGNOSIS — U07.1 COVID-19 VIRUS DETECTED: Primary | ICD-10-CM

## 2022-05-30 LAB
FLUAV RNA RESP QL NAA+PROBE: NOT DETECTED
FLUBV RNA RESP QL NAA+PROBE: NOT DETECTED
RSV AG NPH QL IA.RAPID: NOT DETECTED
SARS-COV-2 RNA RESP QL NAA+PROBE: DETECTED

## 2022-05-30 PROCEDURE — 0241U POCT COVID/FLU/RSV PANEL: CPT | Performed by: NURSE PRACTITIONER

## 2022-05-30 PROCEDURE — 99214 OFFICE O/P EST MOD 30 MIN: CPT | Performed by: NURSE PRACTITIONER

## 2022-05-30 RX ORDER — ACETAMINOPHEN 325 MG/1
325 TABLET ORAL ONCE
Status: COMPLETED | OUTPATIENT
Start: 2022-05-30 | End: 2022-05-30

## 2022-05-30 RX ADMIN — ACETAMINOPHEN 325 MG: 325 TABLET ORAL at 12:36

## 2022-05-30 ASSESSMENT — ENCOUNTER SYMPTOMS
PSYCHIATRIC NEGATIVE: 1
CHILLS: 1
EYES NEGATIVE: 1
FEVER: 1
GASTROINTESTINAL NEGATIVE: 1
HEADACHES: 1
RESPIRATORY NEGATIVE: 1

## 2022-05-30 ASSESSMENT — PAIN SCALES - GENERAL
PAINLEVEL_OUTOF10: 7
PAINLEVEL: 7

## 2022-07-27 ENCOUNTER — TELEPHONE (OUTPATIENT)
Dept: FAMILY MEDICINE CLINIC | Facility: CLINIC | Age: 32
End: 2022-07-27

## 2022-07-27 NOTE — TELEPHONE ENCOUNTER
Call made to patient. Symptoms started yesterday; nasal congestion, body aches, feeling hot and sweaty but did not check temp. Positive for covid end of May. Has not tested for covid with current symptoms. Discussed OTC medications, home care and when to be seen in IC/ER. To call with any questions or concerns. Patient notified. Patient verbalized understanding of information given.

## 2022-07-27 NOTE — TELEPHONE ENCOUNTER
Pt has sinus congestion and feels like he has a fever. .Started yesterday and has not tested for Covid.  Please advise

## 2022-10-12 ENCOUNTER — OFFICE VISIT (OUTPATIENT)
Dept: FAMILY MEDICINE CLINIC | Facility: CLINIC | Age: 32
End: 2022-10-12
Payer: COMMERCIAL

## 2022-10-12 VITALS
BODY MASS INDEX: 27.97 KG/M2 | DIASTOLIC BLOOD PRESSURE: 74 MMHG | TEMPERATURE: 99 F | HEIGHT: 71 IN | WEIGHT: 199.81 LBS | OXYGEN SATURATION: 99 % | SYSTOLIC BLOOD PRESSURE: 130 MMHG | HEART RATE: 74 BPM | RESPIRATION RATE: 17 BRPM

## 2022-10-12 DIAGNOSIS — R09.81 NASAL CONGESTION: ICD-10-CM

## 2022-10-12 DIAGNOSIS — G47.33 OSA ON CPAP: ICD-10-CM

## 2022-10-12 DIAGNOSIS — Z13.220 LIPID SCREENING: ICD-10-CM

## 2022-10-12 DIAGNOSIS — Z99.89 OSA ON CPAP: ICD-10-CM

## 2022-10-12 DIAGNOSIS — Z00.00 ANNUAL PHYSICAL EXAM: Primary | ICD-10-CM

## 2022-10-12 PROBLEM — R79.89 AZOTEMIA: Status: RESOLVED | Noted: 2020-12-03 | Resolved: 2022-10-12

## 2022-10-12 PROCEDURE — 3075F SYST BP GE 130 - 139MM HG: CPT | Performed by: FAMILY MEDICINE

## 2022-10-12 PROCEDURE — 3008F BODY MASS INDEX DOCD: CPT | Performed by: FAMILY MEDICINE

## 2022-10-12 PROCEDURE — 99395 PREV VISIT EST AGE 18-39: CPT | Performed by: FAMILY MEDICINE

## 2022-10-12 PROCEDURE — 3078F DIAST BP <80 MM HG: CPT | Performed by: FAMILY MEDICINE

## 2022-11-16 ENCOUNTER — WALK IN (OUTPATIENT)
Dept: URGENT CARE | Age: 32
End: 2022-11-16

## 2022-11-16 VITALS
HEART RATE: 84 BPM | RESPIRATION RATE: 16 BRPM | SYSTOLIC BLOOD PRESSURE: 134 MMHG | OXYGEN SATURATION: 100 % | TEMPERATURE: 98.8 F | DIASTOLIC BLOOD PRESSURE: 72 MMHG

## 2022-11-16 DIAGNOSIS — R09.81 SINUS CONGESTION: Primary | ICD-10-CM

## 2022-11-16 LAB
INTERNAL PROCEDURAL CONTROLS ACCEPTABLE: YES
SARS-COV+SARS-COV-2 AG RESP QL IA.RAPID: NOT DETECTED
TEST LOT EXPIRATION DATE: NORMAL
TEST LOT NUMBER: NORMAL

## 2022-11-16 PROCEDURE — 87426 SARSCOV CORONAVIRUS AG IA: CPT | Performed by: INTERNAL MEDICINE

## 2022-11-16 PROCEDURE — 99214 OFFICE O/P EST MOD 30 MIN: CPT | Performed by: INTERNAL MEDICINE

## 2022-11-16 RX ORDER — AMOXICILLIN AND CLAVULANATE POTASSIUM 875; 125 MG/1; MG/1
1 TABLET, FILM COATED ORAL 2 TIMES DAILY
Qty: 20 TABLET | Refills: 0 | Status: SHIPPED | OUTPATIENT
Start: 2022-11-16 | End: 2022-11-26

## 2022-11-16 ASSESSMENT — PAIN SCALES - GENERAL: PAINLEVEL: 0

## 2023-02-02 ENCOUNTER — TELEPHONE (OUTPATIENT)
Dept: FAMILY MEDICINE CLINIC | Facility: CLINIC | Age: 33
End: 2023-02-02

## 2023-02-02 DIAGNOSIS — Z01.818 PREOP TESTING: Primary | ICD-10-CM

## 2023-02-02 NOTE — TELEPHONE ENCOUNTER
\"Pre operative lab work should be ordered per pt's history\"    Routed to Dr. Nakia Escobar - is there anything you would like to order?

## 2023-02-02 NOTE — TELEPHONE ENCOUNTER
Received paperwork for pre op for surgery on 3/16/23 for sinus surgery, turbinate reduction, possible septoplasty. Dr. Surendra Rodrigez.     Orders needed:    H&P  Pre operative lab work should be ordered per pt's history    Paperwork in triage in front of printer for review.

## 2023-02-15 ENCOUNTER — LAB ENCOUNTER (OUTPATIENT)
Dept: LAB | Age: 33
End: 2023-02-15
Attending: FAMILY MEDICINE
Payer: COMMERCIAL

## 2023-02-15 DIAGNOSIS — Z13.220 LIPID SCREENING: ICD-10-CM

## 2023-02-15 DIAGNOSIS — Z01.818 PREOP TESTING: ICD-10-CM

## 2023-02-15 LAB
ALBUMIN SERPL-MCNC: 4 G/DL (ref 3.4–5)
ALBUMIN/GLOB SERPL: 1.3 {RATIO} (ref 1–2)
ALP LIVER SERPL-CCNC: 46 U/L
ALT SERPL-CCNC: 25 U/L
ANION GAP SERPL CALC-SCNC: 4 MMOL/L (ref 0–18)
AST SERPL-CCNC: 17 U/L (ref 15–37)
BASOPHILS # BLD AUTO: 0.01 X10(3) UL (ref 0–0.2)
BASOPHILS NFR BLD AUTO: 0.3 %
BILIRUB SERPL-MCNC: 0.4 MG/DL (ref 0.1–2)
BUN BLD-MCNC: 14 MG/DL (ref 7–18)
CALCIUM BLD-MCNC: 9 MG/DL (ref 8.5–10.1)
CHLORIDE SERPL-SCNC: 106 MMOL/L (ref 98–112)
CHOLEST SERPL-MCNC: 161 MG/DL (ref ?–200)
CO2 SERPL-SCNC: 30 MMOL/L (ref 21–32)
CREAT BLD-MCNC: 0.98 MG/DL
EOSINOPHIL # BLD AUTO: 0.07 X10(3) UL (ref 0–0.7)
EOSINOPHIL NFR BLD AUTO: 1.9 %
ERYTHROCYTE [DISTWIDTH] IN BLOOD BY AUTOMATED COUNT: 12.3 %
FASTING PATIENT LIPID ANSWER: YES
FASTING STATUS PATIENT QL REPORTED: YES
GFR SERPLBLD BASED ON 1.73 SQ M-ARVRAT: 106 ML/MIN/1.73M2 (ref 60–?)
GLOBULIN PLAS-MCNC: 3.2 G/DL (ref 2.8–4.4)
GLUCOSE BLD-MCNC: 100 MG/DL (ref 70–99)
HCT VFR BLD AUTO: 43 %
HDLC SERPL-MCNC: 46 MG/DL (ref 40–59)
HGB BLD-MCNC: 14.6 G/DL
IMM GRANULOCYTES # BLD AUTO: 0.01 X10(3) UL (ref 0–1)
IMM GRANULOCYTES NFR BLD: 0.3 %
LDLC SERPL CALC-MCNC: 103 MG/DL (ref ?–100)
LYMPHOCYTES # BLD AUTO: 1.4 X10(3) UL (ref 1–4)
LYMPHOCYTES NFR BLD AUTO: 38.5 %
MCH RBC QN AUTO: 29.2 PG (ref 26–34)
MCHC RBC AUTO-ENTMCNC: 34 G/DL (ref 31–37)
MCV RBC AUTO: 86 FL
MONOCYTES # BLD AUTO: 0.4 X10(3) UL (ref 0.1–1)
MONOCYTES NFR BLD AUTO: 11 %
NEUTROPHILS # BLD AUTO: 1.75 X10 (3) UL (ref 1.5–7.7)
NEUTROPHILS # BLD AUTO: 1.75 X10(3) UL (ref 1.5–7.7)
NEUTROPHILS NFR BLD AUTO: 48 %
NONHDLC SERPL-MCNC: 115 MG/DL (ref ?–130)
OSMOLALITY SERPL CALC.SUM OF ELEC: 291 MOSM/KG (ref 275–295)
PLATELET # BLD AUTO: 270 10(3)UL (ref 150–450)
POTASSIUM SERPL-SCNC: 4.3 MMOL/L (ref 3.5–5.1)
PROT SERPL-MCNC: 7.2 G/DL (ref 6.4–8.2)
RBC # BLD AUTO: 5 X10(6)UL
SODIUM SERPL-SCNC: 140 MMOL/L (ref 136–145)
TRIGL SERPL-MCNC: 62 MG/DL (ref 30–149)
VLDLC SERPL CALC-MCNC: 10 MG/DL (ref 0–30)
WBC # BLD AUTO: 3.6 X10(3) UL (ref 4–11)

## 2023-02-15 PROCEDURE — 80053 COMPREHEN METABOLIC PANEL: CPT

## 2023-02-15 PROCEDURE — 36415 COLL VENOUS BLD VENIPUNCTURE: CPT

## 2023-02-15 PROCEDURE — 80061 LIPID PANEL: CPT

## 2023-02-15 PROCEDURE — 85025 COMPLETE CBC W/AUTO DIFF WBC: CPT

## 2023-02-21 ENCOUNTER — OFFICE VISIT (OUTPATIENT)
Dept: FAMILY MEDICINE CLINIC | Facility: CLINIC | Age: 33
End: 2023-02-21
Payer: COMMERCIAL

## 2023-02-21 VITALS
HEIGHT: 71 IN | BODY MASS INDEX: 26.46 KG/M2 | DIASTOLIC BLOOD PRESSURE: 70 MMHG | OXYGEN SATURATION: 99 % | RESPIRATION RATE: 16 BRPM | TEMPERATURE: 98 F | SYSTOLIC BLOOD PRESSURE: 128 MMHG | HEART RATE: 72 BPM | WEIGHT: 189 LBS

## 2023-02-21 DIAGNOSIS — J34.3 NASAL TURBINATE HYPERTROPHY: ICD-10-CM

## 2023-02-21 DIAGNOSIS — Z01.818 PREOP EXAMINATION: Primary | ICD-10-CM

## 2023-02-21 DIAGNOSIS — Z99.89 OSA ON CPAP: ICD-10-CM

## 2023-02-21 DIAGNOSIS — R09.81 SINUS CONGESTION: ICD-10-CM

## 2023-02-21 DIAGNOSIS — G47.33 OSA ON CPAP: ICD-10-CM

## 2023-02-21 PROCEDURE — 99213 OFFICE O/P EST LOW 20 MIN: CPT | Performed by: FAMILY MEDICINE

## 2023-02-21 PROCEDURE — 3074F SYST BP LT 130 MM HG: CPT | Performed by: FAMILY MEDICINE

## 2023-02-21 PROCEDURE — 3008F BODY MASS INDEX DOCD: CPT | Performed by: FAMILY MEDICINE

## 2023-02-21 PROCEDURE — 3078F DIAST BP <80 MM HG: CPT | Performed by: FAMILY MEDICINE

## 2024-06-24 ENCOUNTER — APPOINTMENT (OUTPATIENT)
Dept: CT IMAGING | Facility: HOSPITAL | Age: 34
End: 2024-06-24
Attending: EMERGENCY MEDICINE

## 2024-06-24 ENCOUNTER — HOSPITAL ENCOUNTER (EMERGENCY)
Facility: HOSPITAL | Age: 34
Discharge: HOME | End: 2024-06-24
Attending: EMERGENCY MEDICINE

## 2024-06-24 VITALS
HEIGHT: 71 IN | HEART RATE: 87 BPM | SYSTOLIC BLOOD PRESSURE: 134 MMHG | RESPIRATION RATE: 16 BRPM | TEMPERATURE: 99 F | BODY MASS INDEX: 26.6 KG/M2 | OXYGEN SATURATION: 100 % | DIASTOLIC BLOOD PRESSURE: 71 MMHG | WEIGHT: 190 LBS

## 2024-06-24 DIAGNOSIS — D72.829 LEUKOCYTOSIS, UNSPECIFIED TYPE: ICD-10-CM

## 2024-06-24 DIAGNOSIS — R55 SYNCOPE, VASOVAGAL: Primary | ICD-10-CM

## 2024-06-24 DIAGNOSIS — S00.01XA ABRASION OF SCALP, INITIAL ENCOUNTER: ICD-10-CM

## 2024-06-24 DIAGNOSIS — S09.90XA INJURY OF HEAD, INITIAL ENCOUNTER: ICD-10-CM

## 2024-06-24 DIAGNOSIS — R10.9 ABDOMINAL CRAMPING: ICD-10-CM

## 2024-06-24 LAB
ALBUMIN SERPL-MCNC: 4.1 G/DL (ref 3.4–5)
ALBUMIN/GLOB SERPL: 1.5 {RATIO} (ref 1–2)
ALP LIVER SERPL-CCNC: 52 U/L
ALT SERPL-CCNC: 54 U/L
ANION GAP SERPL CALC-SCNC: 8 MMOL/L (ref 0–18)
AST SERPL-CCNC: 32 U/L (ref 15–37)
ATRIAL RATE: 78 BPM
BASOPHILS # BLD AUTO: 0.04 X10(3) UL (ref 0–0.2)
BASOPHILS NFR BLD AUTO: 0.3 %
BILIRUB SERPL-MCNC: 0.7 MG/DL (ref 0.1–2)
BUN BLD-MCNC: 20 MG/DL (ref 9–23)
CALCIUM BLD-MCNC: 8.9 MG/DL (ref 8.5–10.1)
CHLORIDE SERPL-SCNC: 108 MMOL/L (ref 98–112)
CO2 SERPL-SCNC: 26 MMOL/L (ref 21–32)
CREAT BLD-MCNC: 1.16 MG/DL
EGFRCR SERPLBLD CKD-EPI 2021: 85 ML/MIN/1.73M2 (ref 60–?)
EOSINOPHIL # BLD AUTO: 0.22 X10(3) UL (ref 0–0.7)
EOSINOPHIL NFR BLD AUTO: 1.6 %
ERYTHROCYTE [DISTWIDTH] IN BLOOD BY AUTOMATED COUNT: 12.6 %
GLOBULIN PLAS-MCNC: 2.7 G/DL (ref 2.8–4.4)
GLUCOSE BLD-MCNC: 113 MG/DL (ref 70–99)
GLUCOSE BLD-MCNC: 114 MG/DL (ref 70–99)
HCT VFR BLD AUTO: 41.9 %
HGB BLD-MCNC: 15 G/DL
IMM GRANULOCYTES # BLD AUTO: 0.04 X10(3) UL (ref 0–1)
IMM GRANULOCYTES NFR BLD: 0.3 %
LYMPHOCYTES # BLD AUTO: 1.24 X10(3) UL (ref 1–4)
LYMPHOCYTES NFR BLD AUTO: 9.1 %
MAGNESIUM SERPL-MCNC: 1.7 MG/DL (ref 1.6–2.6)
MCH RBC QN AUTO: 29.6 PG (ref 26–34)
MCHC RBC AUTO-ENTMCNC: 35.8 G/DL (ref 31–37)
MCV RBC AUTO: 82.8 FL
MONOCYTES # BLD AUTO: 1.03 X10(3) UL (ref 0.1–1)
MONOCYTES NFR BLD AUTO: 7.6 %
NEUTROPHILS # BLD AUTO: 11.03 X10 (3) UL (ref 1.5–7.7)
NEUTROPHILS # BLD AUTO: 11.03 X10(3) UL (ref 1.5–7.7)
NEUTROPHILS NFR BLD AUTO: 81.1 %
OSMOLALITY SERPL CALC.SUM OF ELEC: 297 MOSM/KG (ref 275–295)
P AXIS: 13 DEGREES
P-R INTERVAL: 122 MS
PLATELET # BLD AUTO: 231 10(3)UL (ref 150–450)
POTASSIUM SERPL-SCNC: 3.6 MMOL/L (ref 3.5–5.1)
PROT SERPL-MCNC: 6.8 G/DL (ref 6.4–8.2)
Q-T INTERVAL: 338 MS
QRS DURATION: 86 MS
QTC CALCULATION (BEZET): 385 MS
R AXIS: 55 DEGREES
RBC # BLD AUTO: 5.06 X10(6)UL
SODIUM SERPL-SCNC: 142 MMOL/L (ref 136–145)
T AXIS: 27 DEGREES
VENTRICULAR RATE: 78 BPM
WBC # BLD AUTO: 13.6 X10(3) UL (ref 4–11)

## 2024-06-24 PROCEDURE — 82962 GLUCOSE BLOOD TEST: CPT

## 2024-06-24 PROCEDURE — 99285 EMERGENCY DEPT VISIT HI MDM: CPT

## 2024-06-24 PROCEDURE — 93005 ELECTROCARDIOGRAM TRACING: CPT

## 2024-06-24 PROCEDURE — 72125 CT NECK SPINE W/O DYE: CPT | Performed by: EMERGENCY MEDICINE

## 2024-06-24 PROCEDURE — 83735 ASSAY OF MAGNESIUM: CPT | Performed by: EMERGENCY MEDICINE

## 2024-06-24 PROCEDURE — 85025 COMPLETE CBC W/AUTO DIFF WBC: CPT | Performed by: EMERGENCY MEDICINE

## 2024-06-24 PROCEDURE — 93010 ELECTROCARDIOGRAM REPORT: CPT

## 2024-06-24 PROCEDURE — 96360 HYDRATION IV INFUSION INIT: CPT

## 2024-06-24 PROCEDURE — 70450 CT HEAD/BRAIN W/O DYE: CPT | Performed by: EMERGENCY MEDICINE

## 2024-06-24 PROCEDURE — 80053 COMPREHEN METABOLIC PANEL: CPT | Performed by: EMERGENCY MEDICINE

## 2024-06-24 RX ORDER — ONDANSETRON 4 MG/1
4 TABLET, ORALLY DISINTEGRATING ORAL EVERY 8 HOURS PRN
Qty: 10 TABLET | Refills: 0 | Status: SHIPPED | OUTPATIENT
Start: 2024-06-24 | End: 2024-07-04

## 2024-06-24 RX ORDER — LIDOCAINE HYDROCHLORIDE AND EPINEPHRINE 10; 10 MG/ML; UG/ML
INJECTION, SOLUTION INFILTRATION; PERINEURAL
Status: DISCONTINUED
Start: 2024-06-24 | End: 2024-06-24

## 2024-06-24 NOTE — ED PROVIDER NOTES
Patient Seen in: [facility] Emergency Department      History     Chief Complaint   Patient presents with    Syncope    Nausea/Vomiting/Diarrhea     Stated Complaint: syncope, diarrhea, lac to back of head and left cheek    Subjective:   Patient is a 34-year-old male presents emergency room for generalized abdominal pain that caused him to have profuse diarrhea and then patient had a syncopal episode.  He collapsed and woke up on the ground.  Has a laceration on the posterior aspect of his left scalp.  Patient reports 1 corona tonight.  Patient is feeling better after EMS gave Zofran.  His blood pressure is improved.    The history is provided by the patient and the EMS personnel.           Objective:   Past Medical History:    ACNE    previously on accutane    Obstructive sleep apnea              Past Surgical History:   Procedure Laterality Date    Other surgical history      s/p wisdom tooth extraction    Repair of nasal septum                  Social History     Socioeconomic History    Marital status:     Number of children: 0   Occupational History    Occupation: Student at [employer]   Tobacco Use    Smoking status: Former    Smokeless tobacco: Never    Tobacco comments:     vape   Vaping Use    Vaping status: Former   Substance and Sexual Activity    Alcohol use: Yes     Alcohol/week: 0.0 standard drinks of alcohol     Comment: occasional    Drug use: No   Other Topics Concern     Service No    Caffeine Concern Yes     Comment: 1 cup coffee daily    Exercise No    Seat Belt Yes              Review of Systems   Gastrointestinal:  Positive for diarrhea and nausea.   Skin:  Positive for wound.   Neurological:  Positive for syncope.       Positive for stated complaint: syncope, diarrhea, lac to back of head and left cheek  Other systems are as noted in HPI.  Constitutional and vital signs reviewed.      All other systems reviewed and negative except as noted above.    Physical Exam      ED Triage Vitals   BP 06/24/24 0236 134/71   Pulse 06/24/24 0236 84   Resp 06/24/24 0236 18   Temp 06/24/24 0239 99 °F (37.2 °C)   Temp src --    SpO2 06/24/24 0236 100 %   O2 Device 06/24/24 0236 None (Room air)       Current Vitals:   Vital Signs  BP: 134/71  Pulse: 84  Resp: 18  Temp: 99 °F (37.2 °C)    Oxygen Therapy  SpO2: 100 %  O2 Device: None (Room air)            Physical Exam  Vitals and nursing note reviewed.   Constitutional:       General: He is not in acute distress.     Appearance: He is well-developed and normal weight. He is not ill-appearing or toxic-appearing.   HENT:      Head: Normocephalic.      Comments: Abrasion on posterior left scalp, no active bleeding  Eyes:      Extraocular Movements: Extraocular movements intact.      Pupils: Pupils are equal, round, and reactive to light.   Cardiovascular:      Rate and Rhythm: Normal rate and regular rhythm.      Heart sounds: Normal heart sounds.   Pulmonary:      Effort: Pulmonary effort is normal.      Breath sounds: Normal breath sounds.   Abdominal:      General: Bowel sounds are normal. There is no distension.      Palpations: Abdomen is soft.      Tenderness: There is no abdominal tenderness. There is no guarding or rebound.   Skin:     General: Skin is warm.      Capillary Refill: Capillary refill takes less than 2 seconds.   Neurological:      General: No focal deficit present.      Mental Status: He is alert and oriented to person, place, and time.   Psychiatric:         Mood and Affect: Mood normal.         Behavior: Behavior normal.               ED Course     Labs Reviewed   COMP METABOLIC PANEL (14) - Abnormal; Notable for the following components:       Result Value    Glucose 113 (*)     Calculated Osmolality 297 (*)     Globulin  2.7 (*)     All other components within normal limits   POCT GLUCOSE - Abnormal; Notable for the following components:    POC Glucose 114 (*)     All other components within normal limits   CBC W/  DIFFERENTIAL - Abnormal; Notable for the following components:    WBC 13.6 (*)     Neutrophil Absolute Prelim 11.03 (*)     Neutrophil Absolute 11.03 (*)     Monocyte Absolute 1.03 (*)     All other components within normal limits   MAGNESIUM - Normal   CBC WITH DIFFERENTIAL WITH PLATELET    Narrative:     The following orders were created for panel order CBC With Differential With Platelet.  Procedure                               Abnormality         Status                     ---------                               -----------         ------                     CBC W/ DIFFERENTIAL[[id number]]          Abnormal            Final result                 Please view results for these tests on the individual orders.   RAINBOW DRAW LAVENDER   RAINBOW DRAW LIGHT GREEN   RAINBOW DRAW BLUE   RAINBOW DRAW GOLD     EKG    Rate, intervals and axes as noted on EKG Report.  Rate: 78  Rhythm: Sinus Rhythm  Reading: Normal sinus rhythm no ST elevation AL interval 122 QRS of 86 QTc of 385 with axes of 13/55/27            Scalp abrasion will be cleaned and applied with bacitracin no sutures or staples repair required     CT scan of the head shows no acute intracranial hemorrhage midline shift or mass effect no acute fracture pansinusitis.  Postoperative changes noted in the maxillary sinus bilaterally.  Patent mastoid air cells bilaterally.  C-spine CT shows no acute fracture or spondylolisthesis.         MDM      Social -negative tobacco, occasional etoh, negative drugs  Family History-noncontributory  Past Medical History-sleep apnea, acne    Differential diagnosis before testing included vasovagal syncope, dehydration, hypotension, head injury, intracranial fracture, abrasion, laceration    Co-morbidities that add to the complexity of management include: Patient is up-to-date with tetanus shot, otherwise no significant past medical history    Testing ordered during this visit included patient will receive a CT scan of the head and  neck given syncopal episode will check baseline labs EKG    Radiographic images  I personally reviewed the radiographs and my individual interpretation shows no acute fracture or intracranial hemorrhage  I also reviewed the official reports that showed CT scan of the head shows no acute intracranial hemorrhage midline shift or mass effect no acute fracture pansinusitis.  Postoperative changes noted in the maxillary sinus bilaterally.  Patent mastoid air cells bilaterally.  C-spine CT shows no acute fracture or spondylolisthesis.    External chart review showed review of care everywhere in epic system shows no related comorbidities to current presentation    History obtained by an independent source included from patient, EMS    Discussion of management with patient, EMS    Social determinants of health that affect care include not applicable      Medications Provided: IV fluids, Zofran,    Course of Events during Emergency Room Visit include 34-year-old male with syncopal episode after having an episode of diarrhea.  He struck his head when he had a syncopal episode we will get an EKG CT scan of head and neck and check baseline labs.  Patient was given IV fluids antiemetics.  Blood pressures improved.  Reassurance given he is up-to-date with his tetanus shot will clean wound and see if patient requires sutures.  Patient will need follow-up with his primary care physician          Disposition:          Discharge  I have discussed with the patient the results of test, differential diagnosis, treatment plan, warning signs and symptoms which should prompt immediate return.  They expressed understanding of these instructions and agrees to the following plan provided.  They were given written discharge instructions and agrees to return for any concerns and voiced understanding and all questions were answered.                                      Medical Decision Making      Disposition and Plan     Clinical  Impression:  1. Syncope, vasovagal    2. Abrasion of scalp, initial encounter    3. Abdominal cramping    4. Injury of head, initial encounter    5. Leukocytosis, unspecified type         Disposition:  Discharge  6/24/2024  3:40 am    Follow-up:  Jack Brannon MD  [address]  [facility] [address]  [phone]    Schedule an appointment as soon as possible for a visit            Medications Prescribed:  Current Discharge Medication List        START taking these medications    Details   bacitracin 500 UNIT/GM External Ointment Apply 1 Application topically 2 (two) times daily for 10 days.  Qty: 15 g, Refills: 0      ondansetron 4 MG Oral Tablet Dispersible Take 1 tablet (4 mg total) by mouth every 8 (eight) hours as needed for Nausea (vomiting).  Qty: 10 tablet, Refills: 0

## 2024-06-24 NOTE — ED INITIAL ASSESSMENT (HPI)
Patient had syncope episode while having BM, patient arrives in ccollar, lacs nathan back of head and left cheek per ems.

## (undated) DEVICE — PROXIMATE SKIN STAPLERS (35 WIDE) CONTAINS 35 STAINLESS STEEL STAPLES (FIXED HEAD): Brand: PROXIMATE

## (undated) DEVICE — LAPAROTOMY CDS: Brand: MEDLINE INDUSTRIES, INC.

## (undated) DEVICE — LAPAROTOMY SPONGE - RF AND X-RAY DETECTABLE PRE-WASHED: Brand: SITUATE

## (undated) DEVICE — PROXIMATE RELOADABLE LINEAR STAPLER: Brand: PROXIMATE

## (undated) DEVICE — SUTURE VICRYL 0

## (undated) DEVICE — KENDALL SCD EXPRESS SLEEVES, KNEE LENGTH, MEDIUM: Brand: KENDALL SCD

## (undated) DEVICE — SUTURE VICRYL 2-0

## (undated) DEVICE — DRAPE EQUIPMENT INTRATEMP THER

## (undated) DEVICE — GOWN,SIRUS,FABRIC-REINFORCED,X-LARGE: Brand: MEDLINE

## (undated) DEVICE — LIGASURE IMPACT OPEN DEVICE

## (undated) DEVICE — 3M™ TEGADERM™ TRANSPARENT FILM DRESSING, 1626W, 4 IN X 4-3/4 IN (10 CM X 12 CM), 50 EACH/CARTON, 4 CARTON/CASE: Brand: 3M™ TEGADERM™

## (undated) DEVICE — VIOLET BRAIDED (POLYGLACTIN 910), SYNTHETIC ABSORBABLE SUTURE: Brand: COATED VICRYL

## (undated) DEVICE — PROXIMATE RELOADABLE LINEAR CUTTER WITH SAFETY LOCK-OUT, 75MM: Brand: PROXIMATE

## (undated) DEVICE — SUTURE PDS II 1 TP-1

## (undated) DEVICE — STERILE SYNTHETIC POLYISOPRENE POWDER-FREE SURGICAL GLOVES WITH HYDROGEL COATING: Brand: PROTEXIS

## (undated) DEVICE — LIGHT HANDLE

## (undated) DEVICE — CLOSING BUNDLE: Brand: MEDLINE INDUSTRIES, INC.

## (undated) DEVICE — SOL  .9 1000ML BTL

## (undated) DEVICE — CHLORAPREP 26ML APPLICATOR

## (undated) NOTE — LETTER
[address]ina [address]  [address] [facility]  Authorization for Surgical Operation and Procedure     Date:___________                                                                                                         Time:__________ 4.   Should the need arise during my operation or immediate post-operative period, I also consent to the administration of blood and/or blood products.   Further, I understand that despite careful testing and screening of blood or blood products by jesus 8.   I recognize that in the event my procedure results in extended X-Ray/fluoroscopy time, I may develop a skin reaction. 9.  If I have a Do Not Attempt Resuscitation (DNAR) order in place, that status will be suspended while in the operating room, proc 1. ISesar agree to be cared for by an anesthesiologist, who is specially trained to monitor me and give me medicine to put me to sleep or keep me comfortable during my procedure    I understand that my anesthesiologist is not an employee or ag 5. My doctor has explained to me other choices available to me for my care (alternatives).   6. Pregnant Patients (“epidural”):  I understand that the risks of having an epidural (medicine given into my back to help control pain during labor), include itchi

## (undated) NOTE — ED AVS SNAPSHOT
Moe Oliver   MRN: [id number]    Department:  [facility] Emergency Department   Date of Visit:  7/20/2019           Disclosure     Insurance plans vary and the physician(s) referred by the ER may not be covered by your plan.  Please contact yo tell this physician (or your personal doctor if your instructions are to return to your personal doctor) about any new or lasting problems. The primary care or specialist physician will see patients referred from the [facility] Emergency Department.  Viet Luevano

## (undated) NOTE — LETTER
Date & Time: 6/24/2024, 4:11 AM  Patient: Salvador Mckeon  Encounter Provider(s):    Meenu Dumas DO       To Whom It May Concern:    Salvador Mckeon was seen and treated in our department on 6/24/2024. Please excuse him from work for 6/24/24 and 6/25/24.    If you have any questions or concerns, please do not hesitate to call.        _____________________________  Physician/APC Signature